# Patient Record
Sex: FEMALE | Race: WHITE | Employment: OTHER | URBAN - METROPOLITAN AREA
[De-identification: names, ages, dates, MRNs, and addresses within clinical notes are randomized per-mention and may not be internally consistent; named-entity substitution may affect disease eponyms.]

---

## 2017-04-27 ENCOUNTER — HOSPITAL ENCOUNTER (OUTPATIENT)
Dept: BONE DENSITY | Age: 82
Discharge: HOME | End: 2017-04-27
Attending: FAMILY MEDICINE
Payer: MEDICARE

## 2017-04-27 DIAGNOSIS — Z78.0 MENOPAUSE: ICD-10-CM

## 2017-04-27 PROCEDURE — 77080 DXA BONE DENSITY AXIAL: CPT

## 2017-06-23 ENCOUNTER — HOSPITAL ENCOUNTER (EMERGENCY)
Age: 82
Discharge: HOME | End: 2017-06-23
Attending: EMERGENCY MEDICINE
Payer: MEDICARE

## 2017-06-23 VITALS
BODY MASS INDEX: 28.88 KG/M2 | TEMPERATURE: 98.6 F | WEIGHT: 184 LBS | RESPIRATION RATE: 18 BRPM | SYSTOLIC BLOOD PRESSURE: 151 MMHG | DIASTOLIC BLOOD PRESSURE: 74 MMHG | OXYGEN SATURATION: 98 % | HEIGHT: 67 IN

## 2017-06-23 DIAGNOSIS — S80.811A ABRASION OF LEG, RIGHT, INITIAL ENCOUNTER: Primary | ICD-10-CM

## 2017-06-23 DIAGNOSIS — I83.90 VARICOSE VEIN OF LEG: ICD-10-CM

## 2017-06-23 PROCEDURE — 99282 EMERGENCY DEPT VISIT SF MDM: CPT

## 2017-06-23 NOTE — ED PROVIDER NOTES
HPI Comments: 9:11 AM Norbert Loya is a [de-identified] y.o. female with a history of HTN and Arthritis who presents to the emergency department c/o bleeding to R posterior lower leg that began yesterday after she was stuck with a thorn of a pierre bush. Pt reports excessive bleeding, EMS was called yesterday who bandaged the area. Pt states she had to replace the bandage before going to bed last night. No other complaints or concerns were noted at this time. PCP: Milagro Paulino MD      The history is provided by the patient. Past Medical History:   Diagnosis Date    Allergic rhinitis     Arthritis     Chronic bronchitis (HCC)     Chronic sinusitis     Hypertension     Sleep apnea        History reviewed. No pertinent surgical history. History reviewed. No pertinent family history. Social History     Social History    Marital status: SINGLE     Spouse name: N/A    Number of children: N/A    Years of education: N/A     Occupational History    Not on file. Social History Main Topics    Smoking status: Never Smoker    Smokeless tobacco: Never Used    Alcohol use No    Drug use: Not on file    Sexual activity: Not on file     Other Topics Concern    Not on file     Social History Narrative         ALLERGIES: Codeine and Sulfur    Review of Systems   Constitutional: Negative for chills and fever. HENT: Negative for congestion and rhinorrhea. Respiratory: Negative for cough and shortness of breath. Cardiovascular: Negative for chest pain and leg swelling. Gastrointestinal: Negative for abdominal pain and nausea. Genitourinary: Negative for dysuria and hematuria. Musculoskeletal: Negative for arthralgias and myalgias. Skin: Positive for wound (posterior R lower leg ). Negative for rash. Neurological: Negative for light-headedness and headaches. Psychiatric/Behavioral: Negative for confusion and hallucinations. All other systems reviewed and are negative.       Vitals: 06/23/17 0856   BP: 151/74   Resp: 18   Temp: 98.6 °F (37 °C)   SpO2: 98%   Weight: 83.5 kg (184 lb)   Height: 5' 7\" (1.702 m)            Physical Exam   Constitutional: She is oriented to person, place, and time. She appears well-developed. HENT:   Head: Normocephalic. Mouth/Throat: Oropharynx is clear and moist.   Eyes: Pupils are equal, round, and reactive to light. Neck: Normal range of motion. Cardiovascular: Normal rate and normal heart sounds. No murmur heard. Pulmonary/Chest: Effort normal. She has no wheezes. She has no rales. Abdominal: Soft. There is no tenderness. Musculoskeletal: Normal range of motion. She exhibits no edema. Neurological: She is alert and oriented to person, place, and time. Skin: Skin is warm and dry. Abrasion noted. R lower leg superficial abrasion across a small variceal vein. Abrasion has good hemostasis with no active bleeding. Nursing note and vitals reviewed. [facility]  ED Course       Procedures  Vitals:  Patient Vitals for the past 12 hrs:   Temp Resp BP SpO2   06/23/17 0856 98.6 °F (37 °C) 18 151/74 98 %   98 %. Percentage is within normal limits. Progress notes, Consult notes or additional Procedure notes:  9:17 AM  Pt has been assessed. Discussed all results with pt and pt agrees with plan for discharge. All questions answered at this time. ED warnings given for any new or worsening symptoms. Pt voices understanding. Pt discharged in stable condition. Disposition:  Diagnosis:   1. Abrasion of leg, right, initial encounter    2. Varicose vein of leg        Disposition: Discharged home.        Scribe Attestation:     I, [address] [place] [address] for and in the presence of Dany Mathur MD June 23, 2017 at 9:17 AM     Physician Attestation:   I personally performed the services described in this documentation, reviewed and edited the documentation which was dictated to the scribe in my presence, and it accurately records my words and actions.  Hesham Cruz MD  June 23, 2017 at 9:17 AM    Signed by: Iman Askew June 23, 2017, 9:17 AM

## 2017-06-23 NOTE — ED TRIAGE NOTES
Pt presents to the ED with posterior right leg bleeding after injuring leg yesterday on a thorn of pierre bush. Pt reports excessive bleeding called 911 for medics, states received bandage. Pt reports awoken last night with pajamas and bed \"was soaked in blood. \"

## 2017-06-23 NOTE — DISCHARGE INSTRUCTIONS

## 2017-06-23 NOTE — ED NOTES
Hoang Hamilton is a [de-identified] y.o. female that was discharged in good condition. The patients diagnosis, condition and treatment were explained to  patient and aftercare instructions were given. The patient verbalized understanding. Patient armband removed and shredded.

## 2017-07-03 ENCOUNTER — OFFICE VISIT (OUTPATIENT)
Dept: VASCULAR SURGERY | Age: 82
End: 2017-07-03

## 2017-07-03 VITALS
HEIGHT: 67 IN | WEIGHT: 184 LBS | HEART RATE: 68 BPM | RESPIRATION RATE: 18 BRPM | DIASTOLIC BLOOD PRESSURE: 70 MMHG | BODY MASS INDEX: 28.88 KG/M2 | SYSTOLIC BLOOD PRESSURE: 142 MMHG

## 2017-07-03 DIAGNOSIS — I83.891 BLEEDING FROM VARICOSE VEIN, RIGHT: Primary | ICD-10-CM

## 2017-07-03 NOTE — PROGRESS NOTES
Keke Engel    Chief Complaint   Patient presents with    Varicose Veins       History and Physical    Ms Wendy Winston is here as a self-referral for evaluation of varicose veins. She has actually had a cluster of varicose veins behind the right knee since adolescence. They have never been of any great concern to her as far as symptoms or cosmetics. However last week she was working in the garden, and she actually got stuck with a pierre thorn into 1 of the small varicose veins, and she experienced a lot of bleeding. EMS was called and they came out and were able to control the bleeding. She had a pressure bandage on that she wore. Later that evening she was trying to adjust the bandage and started bleeding again. She did get under control, and notify her primary care physician who advised that she go to the ED the next day. Of course by the time she went to the ED there was no further bleeding and she has had none since. She said she almost canceled today's visit, because she has had more problems with it. She has some things going on at home when she actually became quite emotional about. At this time she does not feel very inclined to want to do anything about the vein. But I did still discuss some options, see below. Past Medical History:   Diagnosis Date    Allergic rhinitis     Arthritis     Chronic bronchitis (HCC)     Chronic sinusitis     Hypertension     Sleep apnea      Patient Active Problem List   Diagnosis Code    Moderate obstructive sleep apnea, AHI 18.3 G47.33    Overweight (BMI 25.0-29. 9) E66.3     History reviewed. No pertinent surgical history. Current Outpatient Prescriptions   Medication Sig Dispense Refill    diltiazem hcl 300 mg Tb24 Take  by mouth.  benazepril (LOTENSIN) 40 mg tablet Take 40 mg by mouth daily.  pravastatin (PRAVACHOL) 40 mg tablet Take 40 mg by mouth nightly.  fluticasone (FLOVENT DISKUS) 50 mcg/actuation inhaler Take  by inhalation.  LORazepam (ATIVAN) 1 mg tablet Take  by mouth every four (4) hours as needed for Anxiety.  cpap machine kit by Does Not Apply route. Auto-CPAP at 5-20 cm with humidifier. Mask: of choice. Length of need 99 months. Replace mask and accessories as needed times 12 months. Download data at 30 days and fax at [phone]. Dx- Moderate FILIBERTO-AHI 18.3 1 Kit 0     Allergies   Allergen Reactions    Codeine Nausea and Vomiting    Sulfur Hives     Social History     Social History    Marital status: SINGLE     Spouse name: N/A    Number of children: N/A    Years of education: N/A     Occupational History    Not on file. Social History Main Topics    Smoking status: Never Smoker    Smokeless tobacco: Never Used    Alcohol use No    Drug use: Not on file    Sexual activity: Not on file     Other Topics Concern    Not on file     Social History Narrative      No family history on file. Review of Systems    Review of Systems - History obtained from the patient  General ROS: negative  Psychological ROS: negative  Ophthalmic ROS: negative  Respiratory ROS: negative  Cardiovascular ROS: no chest pain or dyspnea on exertion  Gastrointestinal ROS: negative  Musculoskeletal ROS: negative  Neurological ROS: negative  Dermatological ROS: negative  Vascular ROS: per HPI        Physical Exam:    Visit Vitals    /70 (BP 1 Location: Left arm, BP Patient Position: Sitting)    Pulse 68    Resp 18    Ht 5' 7\" (1.702 m)    Wt 184 lb (83.5 kg)    BMI 28.82 kg/m2      General:  Alert, cooperative, no distress. Head:  Normocephalic, without obvious abnormality, atraumatic. Eyes:    Conjunctivae/corneas clear. Pupils equal, round, reactive to light. Extraocular movements intact. Extremities: Extremities normal, atraumatic, no cyanosis, mild right leg edema. Cluster of varicose veins behind right knee   Pulses: 2+ and symmetric all extremities.    Skin: Skin color, texture, turgor normal. No rashes or lesions. Impression and Plan:  1. Bleeding from varicose vein, right      I did discuss the pathology of venous disease. As long as these may have been present, she really has not had any issues. This really was an isolated incident. I did tell her about different treatment options for vein problems though, to include ablation procedures and sclerotherapy - for which details for both were explained. She may be a candidate for one or both. I also discussed importance of conservative therapy, which would include stockings and elevation. But she again states that she does not feel like she has enough symptoms that she would warrant wanting to do any of these. But she is fearful that an incident like this could happen again. We both agreed it was a very isolated incidents, and she will just make efforts to be more careful. I did give her some literature to review, and did share that if she reviews it and would decide on wanting to pursue some treatment, that then I would then first order a reflux study to see what her treatment options were then actually be. Follow-up Disposition:  Return if symptoms worsen or fail to improve. OLIVIA Alexander    Portions of this note have been created using voice recognition software.

## 2017-08-11 ENCOUNTER — OFFICE VISIT (OUTPATIENT)
Dept: ORTHOPEDIC SURGERY | Age: 82
End: 2017-08-11

## 2017-08-11 VITALS
BODY MASS INDEX: 28.88 KG/M2 | OXYGEN SATURATION: 97 % | HEIGHT: 67 IN | SYSTOLIC BLOOD PRESSURE: 142 MMHG | TEMPERATURE: 97 F | HEART RATE: 77 BPM | DIASTOLIC BLOOD PRESSURE: 86 MMHG | RESPIRATION RATE: 18 BRPM | WEIGHT: 184 LBS

## 2017-08-11 DIAGNOSIS — G89.29 CHRONIC PAIN OF LEFT KNEE: Primary | ICD-10-CM

## 2017-08-11 DIAGNOSIS — M25.562 CHRONIC PAIN OF LEFT KNEE: Primary | ICD-10-CM

## 2017-08-11 DIAGNOSIS — M17.12 PRIMARY OSTEOARTHRITIS OF LEFT KNEE: ICD-10-CM

## 2017-08-11 NOTE — PROGRESS NOTES
Patient: Armand Barber                MRN: [id number]       SSN: [id number]  YOB: 1935        AGE: [de-identified] y.o. SEX: female  Body mass index is 28.82 kg/(m^2). PCP: Ubaldo Schirmer, MD  08/11/17    HISTORY: I had the pleasure of reviewing [place]. Juanito adkins is quite a character. She is a very young 50-year-old, status post scoliosis surgery and also has had a flare up of her left knee, where she was having difficulty with stairs, kneeling, and getting up and down from a chair. She has had no true locking or giving way, although not too much in the way of night pain. It has tended to resolve, and she is here for followup assessment with this. PHYSICAL EXAMINATION:  On examination today, she moves the head and neck adequately. She does have a touch of a movie theater sign when she first arises. The calf is nontender. Melissa's sign is negative. Her gait pattern tends to smooth out. Both hips rotate nicely. Both feet are warm and well-perfused. There is a little bit of numbness in the saphenous nerve distribution where they took some bone for her scoliosis surgery. The calf is nontender. Melissa's sign is negative. There is no foot drop. She has good strength, and she is much younger-appearing than her stated age. RADIOGRAPHS:  Review of her x-rays confirm moderately advanced patellofemoral arthritis. PLAN:  She had a flare-up. It is fairly quiescent now. She is welcome to have injections when she would like, and I would be very happy to help her with this. I would recommend nonoperative management for the time being. It has been a pleasure to share in her care. We will be happy to see her at any time for an injection.            REVIEW OF SYSTEMS:      CON: negative for weight loss, fever  EYE: negative for double vision  ENT: negative for hoarseness  RS:   negative for Tb  GI:    negative for blood in stool  :  negative for blood in urine  Other systems reviewed and noted below. Past Medical History:   Diagnosis Date    Allergic rhinitis     Arthritis     Chronic bronchitis (HCC)     Chronic sinusitis     Hypertension     Sleep apnea        History reviewed. No pertinent family history. Current Outpatient Prescriptions   Medication Sig Dispense Refill    diltiazem hcl 300 mg Tb24 Take  by mouth.  benazepril (LOTENSIN) 40 mg tablet Take 40 mg by mouth daily.  pravastatin (PRAVACHOL) 40 mg tablet Take 40 mg by mouth nightly.  fluticasone (FLOVENT DISKUS) 50 mcg/actuation inhaler Take  by inhalation.  LORazepam (ATIVAN) 1 mg tablet Take  by mouth every four (4) hours as needed for Anxiety.  cpap machine kit by Does Not Apply route. Auto-CPAP at 5-20 cm with humidifier. Mask: of choice. Length of need 99 months. Replace mask and accessories as needed times 12 months. Download data at 30 days and fax at [phone]. Dx- Moderate FILIBERTO-AHI 18.3 1 Kit 0       Allergies   Allergen Reactions    Codeine Nausea and Vomiting    Sulfur Hives       History reviewed. No pertinent surgical history. Social History     Social History    Marital status: SINGLE     Spouse name: N/A    Number of children: N/A    Years of education: N/A     Occupational History    Not on file. Social History Main Topics    Smoking status: Never Smoker    Smokeless tobacco: Never Used    Alcohol use No    Drug use: Not on file    Sexual activity: Not on file     Other Topics Concern    Not on file     Social History Narrative       Visit Vitals    /86    Pulse 77    Temp 97 °F (36.1 °C) (Oral)    Resp 18    Ht 5' 7\" (1.702 m)    Wt 184 lb (83.5 kg)    SpO2 97%    BMI 28.82 kg/m2         PHYSICAL EXAMINATION:  GENERAL: Alert and oriented x3, in no acute distress, well-developed, well-nourished, afebrile. HEART: No JVD.   EYES: No scleral icterus   NECK: No significant lymphadenopathy   LUNGS: No respiratory compromise or indrawing  ABDOMEN: Soft, non-tender, non-distended. Electronically signed by:  Enmanuel Rios MD

## 2019-08-19 ENCOUNTER — HOSPITAL ENCOUNTER (OUTPATIENT)
Dept: LAB | Age: 84
Discharge: HOME | End: 2019-08-19
Payer: MEDICARE

## 2019-08-19 LAB
C DIFF GDH STL QL: NEGATIVE
C DIFF TOX A+B STL QL IA: NEGATIVE
INTERPRETATION: NORMAL

## 2019-08-19 PROCEDURE — 87045 FECES CULTURE AEROBIC BACT: CPT

## 2019-08-19 PROCEDURE — 83631 LACTOFERRIN FECAL (QUANT): CPT

## 2019-08-19 PROCEDURE — 87177 OVA AND PARASITES SMEARS: CPT

## 2019-08-19 PROCEDURE — 87186 SC STD MICRODIL/AGAR DIL: CPT

## 2019-08-19 PROCEDURE — 87449 NOS EACH ORGANISM AG IA: CPT

## 2019-08-23 LAB
BACTERIA SPEC CULT: NORMAL
G LAMBLIA AG STL QL IA: NEGATIVE
LACTOFERRIN, LCTFLT: 81.6 UG/ML(G) (ref 0–7.24)
O+P STL CONC: NORMAL
SERVICE CMNT-IMP: NORMAL
SPECIMEN SOURCE: NORMAL

## 2019-08-28 LAB
O&P RESULT 1, 080877: NORMAL
SOURCE, RSRC56: NORMAL

## 2019-10-03 ENCOUNTER — APPOINTMENT (OUTPATIENT)
Dept: RADIATION THERAPY | Age: 84
End: 2019-10-03

## 2019-10-15 ENCOUNTER — HOSPITAL ENCOUNTER (OUTPATIENT)
Dept: RADIATION THERAPY | Age: 84
Discharge: HOME | End: 2019-10-15

## 2020-12-08 ENCOUNTER — HOSPITAL ENCOUNTER (EMERGENCY)
Age: 85
Discharge: HOME | End: 2020-12-08
Attending: EMERGENCY MEDICINE
Payer: MEDICARE

## 2020-12-08 VITALS
DIASTOLIC BLOOD PRESSURE: 87 MMHG | WEIGHT: 182 LBS | RESPIRATION RATE: 20 BRPM | HEART RATE: 88 BPM | BODY MASS INDEX: 27.58 KG/M2 | OXYGEN SATURATION: 95 % | HEIGHT: 68 IN | SYSTOLIC BLOOD PRESSURE: 156 MMHG | TEMPERATURE: 98.7 F

## 2020-12-08 DIAGNOSIS — R19.7 DIARRHEA OF PRESUMED INFECTIOUS ORIGIN: Primary | ICD-10-CM

## 2020-12-08 DIAGNOSIS — E87.6 HYPOKALEMIA: ICD-10-CM

## 2020-12-08 LAB
ALBUMIN SERPL-MCNC: 4.1 G/DL (ref 3.4–5)
ALBUMIN/GLOB SERPL: 1.1 {RATIO} (ref 0.8–1.7)
ALP SERPL-CCNC: 79 U/L (ref 45–117)
ALT SERPL-CCNC: 25 U/L (ref 13–56)
ANION GAP SERPL CALC-SCNC: 11 MMOL/L (ref 3–18)
AST SERPL-CCNC: 23 U/L (ref 10–38)
BASOPHILS # BLD: 0.1 K/UL (ref 0–0.1)
BASOPHILS NFR BLD: 1 % (ref 0–2)
BILIRUB SERPL-MCNC: 0.7 MG/DL (ref 0.2–1)
BUN SERPL-MCNC: 10 MG/DL (ref 7–18)
BUN/CREAT SERPL: 12 (ref 12–20)
CALCIUM SERPL-MCNC: 10.3 MG/DL (ref 8.5–10.1)
CHLORIDE SERPL-SCNC: 98 MMOL/L (ref 100–111)
CO2 SERPL-SCNC: 28 MMOL/L (ref 21–32)
CREAT SERPL-MCNC: 0.83 MG/DL (ref 0.6–1.3)
DIFFERENTIAL METHOD BLD: ABNORMAL
EOSINOPHIL # BLD: 0.1 K/UL (ref 0–0.4)
EOSINOPHIL NFR BLD: 1 % (ref 0–5)
ERYTHROCYTE [DISTWIDTH] IN BLOOD BY AUTOMATED COUNT: 13 % (ref 11.6–14.5)
GLOBULIN SER CALC-MCNC: 3.6 G/DL (ref 2–4)
GLUCOSE SERPL-MCNC: 87 MG/DL (ref 74–99)
HCT VFR BLD AUTO: 46.7 % (ref 35–45)
HGB BLD-MCNC: 16.5 G/DL (ref 12–16)
LACTATE BLD-SCNC: 1.35 MMOL/L (ref 0.4–2)
LIPASE SERPL-CCNC: 142 U/L (ref 73–393)
LYMPHOCYTES # BLD: 1.6 K/UL (ref 0.9–3.6)
LYMPHOCYTES NFR BLD: 16 % (ref 21–52)
MAGNESIUM SERPL-MCNC: 2 MG/DL (ref 1.6–2.6)
MCH RBC QN AUTO: 29.5 PG (ref 24–34)
MCHC RBC AUTO-ENTMCNC: 35.3 G/DL (ref 31–37)
MCV RBC AUTO: 83.4 FL (ref 74–97)
MONOCYTES # BLD: 1 K/UL (ref 0.05–1.2)
MONOCYTES NFR BLD: 9 % (ref 3–10)
NEUTS SEG # BLD: 7.6 K/UL (ref 1.8–8)
NEUTS SEG NFR BLD: 73 % (ref 40–73)
PLATELET # BLD AUTO: 283 K/UL (ref 135–420)
PMV BLD AUTO: 11.2 FL (ref 9.2–11.8)
POTASSIUM SERPL-SCNC: 3.1 MMOL/L (ref 3.5–5.5)
PROT SERPL-MCNC: 7.7 G/DL (ref 6.4–8.2)
RBC # BLD AUTO: 5.6 M/UL (ref 4.2–5.3)
SODIUM SERPL-SCNC: 137 MMOL/L (ref 136–145)
WBC # BLD AUTO: 10.3 K/UL (ref 4.6–13.2)

## 2020-12-08 PROCEDURE — 80053 COMPREHEN METABOLIC PANEL: CPT

## 2020-12-08 PROCEDURE — 83690 ASSAY OF LIPASE: CPT

## 2020-12-08 PROCEDURE — 87635 SARS-COV-2 COVID-19 AMP PRB: CPT

## 2020-12-08 PROCEDURE — 99285 EMERGENCY DEPT VISIT HI MDM: CPT

## 2020-12-08 PROCEDURE — 93005 ELECTROCARDIOGRAM TRACING: CPT

## 2020-12-08 PROCEDURE — 85025 COMPLETE CBC W/AUTO DIFF WBC: CPT

## 2020-12-08 PROCEDURE — 83735 ASSAY OF MAGNESIUM: CPT

## 2020-12-08 PROCEDURE — 74011250637 HC RX REV CODE- 250/637: Performed by: STUDENT IN AN ORGANIZED HEALTH CARE EDUCATION/TRAINING PROGRAM

## 2020-12-08 PROCEDURE — 96360 HYDRATION IV INFUSION INIT: CPT

## 2020-12-08 PROCEDURE — 74011250636 HC RX REV CODE- 250/636: Performed by: STUDENT IN AN ORGANIZED HEALTH CARE EDUCATION/TRAINING PROGRAM

## 2020-12-08 PROCEDURE — 83605 ASSAY OF LACTIC ACID: CPT

## 2020-12-08 RX ORDER — POTASSIUM CHLORIDE 20 MEQ/1
40 TABLET, EXTENDED RELEASE ORAL
Status: COMPLETED | OUTPATIENT
Start: 2020-12-08 | End: 2020-12-08

## 2020-12-08 RX ADMIN — POTASSIUM CHLORIDE 40 MEQ: 1500 TABLET, EXTENDED RELEASE ORAL at 19:33

## 2020-12-08 RX ADMIN — SODIUM CHLORIDE 500 ML: 900 INJECTION, SOLUTION INTRAVENOUS at 16:22

## 2020-12-08 NOTE — ED TRIAGE NOTES
Patient arrived via EMS wwith complaints of nausea vomiting and diarrhea since Saturday.  Remains afebrile

## 2020-12-08 NOTE — ED PROVIDER NOTES
EMERGENCY DEPARTMENT HISTORY AND PHYSICAL EXAM    6:37 PM      Date: 12/8/2020  Patient Name: Adilia Valladares    History of Presenting Illness     Chief Complaint   Patient presents with    Nausea    Vomiting    Diarrhea    Hypertension         History Provided By: Patient  Location/Duration/Severity/Modifying factors   80 y/o F with N/V/D Saturday that has since improved but she has residual \"diarrhea\" which she describes as having a BM daily, which is unlike her. She states she has stopped taking her potassium supplement as she thought it might be causing the increased BMs. Tolerating PO- has gatorade in her bag. Denies fever, respiratory symptoms, abdominal pain, chest pain, bloody or black stools. Also had her normal vertigo symptoms Sunday and yesterday which resolved today. PCP: Toni Briscoe MD    Current Facility-Administered Medications   Medication Dose Route Frequency Provider Last Rate Last Dose    potassium chloride (K-DUR, KLOR-CON) SR tablet 40 mEq  40 mEq Oral NOW Sera Lei MD         Current Outpatient Medications   Medication Sig Dispense Refill    diltiazem hcl 300 mg Tb24 Take  by mouth.  benazepril (LOTENSIN) 40 mg tablet Take 40 mg by mouth daily.  pravastatin (PRAVACHOL) 40 mg tablet Take 40 mg by mouth nightly.  fluticasone (FLOVENT DISKUS) 50 mcg/actuation inhaler Take  by inhalation.  LORazepam (ATIVAN) 1 mg tablet Take  by mouth every four (4) hours as needed for Anxiety.  cpap machine kit by Does Not Apply route. Auto-CPAP at 5-20 cm with humidifier. Mask: of choice. Length of need 99 months. Replace mask and accessories as needed times 12 months. Download data at 30 days and fax at [phone].  Dx- Moderate FILIBERTO-AHI 18.3 1 Kit 0       Past History     Past Medical History:  Past Medical History:   Diagnosis Date    Allergic rhinitis     Arthritis     Chronic bronchitis (HCC)     Chronic sinusitis     Hypertension     Sleep apnea        Past Surgical History:  No past surgical history on file. Family History:  No family history on file. Social History:  Social History     Tobacco Use    Smoking status: Never Smoker    Smokeless tobacco: Never Used   Substance Use Topics    Alcohol use: No    Drug use: Not on file       Allergies: Allergies   Allergen Reactions    Codeine Nausea and Vomiting    Sulfur Hives         Review of Systems     Review of Systems   Constitutional: Positive for appetite change. Negative for activity change, fatigue, fever and unexpected weight change. HENT: Negative for congestion and sore throat. Eyes: Negative for photophobia and visual disturbance. Respiratory: Negative for cough and shortness of breath. Cardiovascular: Negative for chest pain and leg swelling. Gastrointestinal: Positive for diarrhea, nausea and vomiting. Negative for abdominal pain and blood in stool. Genitourinary: Negative for dysuria, flank pain, frequency, hematuria and urgency. Musculoskeletal: Negative for myalgias. Neurological: Negative for dizziness, facial asymmetry, speech difficulty, weakness, light-headedness and numbness. Psychiatric/Behavioral: Negative for confusion. All other systems reviewed and are negative. Physical Exam     Visit Vitals  BP (!) 162/98   Pulse 87   Temp 98.7 °F (37.1 °C)   Resp 21   Ht 5' 8\" (1.727 m)   Wt 82.6 kg (182 lb)   SpO2 99%   BMI 27.67 kg/m²         Physical Exam  Vitals signs and nursing note reviewed. Constitutional:       General: She is not in acute distress. Appearance: Normal appearance. She is not ill-appearing or toxic-appearing. HENT:      Head: Normocephalic and atraumatic. Right Ear: External ear normal.      Left Ear: External ear normal.      Nose: Nose normal.      Mouth/Throat:      Mouth: Mucous membranes are moist.   Eyes:      Extraocular Movements: Extraocular movements intact.       Conjunctiva/sclera: Conjunctivae normal. Pupils: Pupils are equal, round, and reactive to light. Neck:      Musculoskeletal: Neck supple. Cardiovascular:      Rate and Rhythm: Regular rhythm. Tachycardia present. Pulses: Normal pulses. Pulmonary:      Effort: Pulmonary effort is normal. No respiratory distress. Abdominal:      General: There is no distension. Palpations: Abdomen is soft. Tenderness: There is no abdominal tenderness. Musculoskeletal:      Right lower leg: No edema. Left lower leg: No edema. Skin:     General: Skin is warm and dry. Capillary Refill: Capillary refill takes less than 2 seconds. Neurological:      General: No focal deficit present. Mental Status: She is alert and oriented to person, place, and time. Cranial Nerves: No cranial nerve deficit. Sensory: No sensory deficit. Motor: No weakness. Coordination: Coordination normal.   Psychiatric:         Mood and Affect: Mood normal.         Behavior: Behavior normal.           Diagnostic Study Results     Labs -  Recent Results (from the past 12 hour(s))   CBC WITH AUTOMATED DIFF    Collection Time: 12/08/20  3:30 PM   Result Value Ref Range    WBC 10.3 4.6 - 13.2 K/uL    RBC 5.60 (H) 4.20 - 5.30 M/uL    HGB 16.5 (H) 12.0 - 16.0 g/dL    HCT 46.7 (H) 35.0 - 45.0 %    MCV 83.4 74.0 - 97.0 FL    MCH 29.5 24.0 - 34.0 PG    MCHC 35.3 31.0 - 37.0 g/dL    RDW 13.0 11.6 - 14.5 %    PLATELET 904 615 - 833 K/uL    MPV 11.2 9.2 - 11.8 FL    NEUTROPHILS 73 40 - 73 %    LYMPHOCYTES 16 (L) 21 - 52 %    MONOCYTES 9 3 - 10 %    EOSINOPHILS 1 0 - 5 %    BASOPHILS 1 0 - 2 %    ABS. NEUTROPHILS 7.6 1.8 - 8.0 K/UL    ABS. LYMPHOCYTES 1.6 0.9 - 3.6 K/UL    ABS. MONOCYTES 1.0 0.05 - 1.2 K/UL    ABS. EOSINOPHILS 0.1 0.0 - 0.4 K/UL    ABS.  BASOPHILS 0.1 0.0 - 0.1 K/UL    DF AUTOMATED     METABOLIC PANEL, COMPREHENSIVE    Collection Time: 12/08/20  3:30 PM   Result Value Ref Range    Sodium 137 136 - 145 mmol/L    Potassium 3.1 (L) 3.5 - 5.5 mmol/L    Chloride 98 (L) 100 - 111 mmol/L    CO2 28 21 - 32 mmol/L    Anion gap 11 3.0 - 18 mmol/L    Glucose 87 74 - 99 mg/dL    BUN 10 7.0 - 18 MG/DL    Creatinine 0.83 0.6 - 1.3 MG/DL    BUN/Creatinine ratio 12 12 - 20      GFR est AA >60 >60 ml/min/1.73m2    GFR est non-AA >60 >60 ml/min/1.73m2    Calcium 10.3 (H) 8.5 - 10.1 MG/DL    Bilirubin, total 0.7 0.2 - 1.0 MG/DL    ALT (SGPT) 25 13 - 56 U/L    AST (SGOT) 23 10 - 38 U/L    Alk. phosphatase 79 45 - 117 U/L    Protein, total 7.7 6.4 - 8.2 g/dL    Albumin 4.1 3.4 - 5.0 g/dL    Globulin 3.6 2.0 - 4.0 g/dL    A-G Ratio 1.1 0.8 - 1.7     LIPASE    Collection Time: 12/08/20  3:30 PM   Result Value Ref Range    Lipase 142 73 - 393 U/L   MAGNESIUM    Collection Time: 12/08/20  3:30 PM   Result Value Ref Range    Magnesium 2.0 1.6 - 2.6 mg/dL   POC LACTIC ACID    Collection Time: 12/08/20  4:32 PM   Result Value Ref Range    Lactic Acid (POC) 1.35 0.40 - 2.00 mmol/L       Radiologic Studies -   No orders to display         Medical Decision Making   I am the first provider for this patient. I reviewed the vital signs, available nursing notes, past medical history, past surgical history, family history and social history. Vital Signs-Reviewed the patient's vital signs. EKG: Sinus tachycardia at 104 with 1st degree AV block (), LVH, no acute ischemic changes    Records Reviewed: Nursing Notes (Time of Review: 6:37 PM)    ED Course: Progress Notes, Reevaluation, and Consults:         Provider Notes (Medical Decision Making):   MDM  Number of Diagnoses or Management Options  Diarrhea of presumed infectious origin:   Hypokalemia:   Diagnosis management comments: 80 y/o F with likely VGE vs food poisoning (onset of symptoms after dinner with sister), now nearly resolved. Patient well-appearing, afebrile, hemodynamically stable. Abdomen soft and nontender. Normal neuro exam. No UTI sx. No CP or SOB. EKG without acute ischemic changes.  Tachycardia resolved with IVF and patient's home gatorade; suspect dehydration given hemoconcentration on CBC and recent illness. No leukocytosis, normal lactate. Hypokalemia noted at 3.1; repleted. Corresponds to patient's reported lapse in taking potassium supplements. Other electrolytes including magnesium and kidney function unremarkable. Patient reassured and given education with regard to potassium supplements. Will discharge with return precautions. Patient understands and agrees with plan. Covid swab per patient request.       Amount and/or Complexity of Data Reviewed  Clinical lab tests: ordered and reviewed  Tests in the medicine section of CPT®: ordered and reviewed  Review and summarize past medical records: yes  Independent visualization of images, tracings, or specimens: yes            Diagnosis     Clinical Impression:   1. Diarrhea of presumed infectious origin    2. Hypokalemia        Disposition: Discharged    Follow-up Information    None          Patient's Medications   Start Taking    No medications on file   Continue Taking    BENAZEPRIL (LOTENSIN) 40 MG TABLET    Take 40 mg by mouth daily. CPAP MACHINE KIT    by Does Not Apply route. Auto-CPAP at 5-20 cm with humidifier. Mask: of choice. Length of need 99 months. Replace mask and accessories as needed times 12 months. Download data at 30 days and fax at [phone]. Dx- Moderate FILIBERTO-AHI 18.3    DILTIAZEM  MG TB24    Take  by mouth. FLUTICASONE (FLOVENT DISKUS) 50 MCG/ACTUATION INHALER    Take  by inhalation. LORAZEPAM (ATIVAN) 1 MG TABLET    Take  by mouth every four (4) hours as needed for Anxiety. PRAVASTATIN (PRAVACHOL) 40 MG TABLET    Take 40 mg by mouth nightly. These Medications have changed    No medications on file   Stop Taking    No medications on file     Disclaimer: Sections of this note are dictated using utilizing voice recognition software. Minor typographical errors may be present.  If questions arise, please do not hesitate to contact me or call our department.

## 2020-12-08 NOTE — DISCHARGE INSTRUCTIONS
Patient Education        Diarrhea: Care Instructions  Your Care Instructions     Diarrhea is loose, watery stools (bowel movements). The exact cause is often hard to find. Sometimes diarrhea is your body's way of getting rid of what caused an upset stomach. Viruses, food poisoning, and many medicines can cause diarrhea. Some people get diarrhea in response to emotional stress, anxiety, or certain foods. Almost everyone has diarrhea now and then. It usually isn't serious, and your stools will return to normal soon. The important thing to do is replace the fluids you have lost, so you can prevent dehydration. The doctor has checked you carefully, but problems can develop later. If you notice any problems or new symptoms, get medical treatment right away. Follow-up care is a key part of your treatment and safety. Be sure to make and go to all appointments, and call your doctor if you are having problems. It's also a good idea to know your test results and keep a list of the medicines you take. How can you care for yourself at home? · Watch for signs of dehydration, which means your body has lost too much water. Dehydration is a serious condition and should be treated right away. Signs of dehydration are:  ? Increasing thirst and dry eyes and mouth. ? Feeling faint or lightheaded. ? A smaller amount of urine than normal.  · To prevent dehydration, drink plenty of fluids. Choose water and other caffeine-free clear liquids until you feel better. If you have kidney, heart, or liver disease and have to limit fluids, talk with your doctor before you increase the amount of fluids you drink. · Begin eating small amounts of mild foods the next day, if you feel like it. ? Try yogurt that has live cultures of Lactobacillus. (Check the label.)  ? Avoid spicy foods, fruits, alcohol, and caffeine until 48 hours after all symptoms are gone. ? Avoid chewing gum that contains sorbitol. ?  Avoid dairy products (except for yogurt with Lactobacillus) while you have diarrhea and for 3 days after symptoms are gone. · The doctor may recommend that you take over-the-counter medicine, such as loperamide (Imodium), if you still have diarrhea after 6 hours. Read and follow all instructions on the label. Do not use this medicine if you have bloody diarrhea, a high fever, or other signs of serious illness. Call your doctor if you think you are having a problem with your medicine. When should you call for help? Call 911 anytime you think you may need emergency care. For example, call if:    · You passed out (lost consciousness).     · Your stools are maroon or very bloody. Call your doctor now or seek immediate medical care if:    · You are dizzy or lightheaded, or you feel like you may faint.     · Your stools are black and look like tar, or they have streaks of blood.     · You have new or worse belly pain.     · You have symptoms of dehydration, such as:  ? Dry eyes and a dry mouth. ? Passing only a little dark urine. ? Feeling thirstier than usual.     · You have a new or higher fever. Watch closely for changes in your health, and be sure to contact your doctor if:    · Your diarrhea is getting worse.     · You see pus in the diarrhea.     · You are not getting better after 2 days (48 hours). Where can you learn more? Go to http://www.gray.com/  Enter [id number] in the search box to learn more about \"Diarrhea: Care Instructions. \"  Current as of: June 26, 2019               Content Version: 12.6  © 8748-5603 [employer], Incorporated. Care instructions adapted under license by [employer] (which disclaims liability or warranty for this information). If you have questions about a medical condition or this instruction, always ask your healthcare professional. [place] [address] any warranty or liability for your use of this information.          Patient Education        Hypokalemia: Care Instructions  Your Care Instructions     Hypokalemia (say \"ie-vg-cab-BEATA-kenneth-uh\") is a low level of potassium. The heart, muscles, kidneys, and nervous system all need potassium to work well. This problem has many different causes. Kidney problems, diet, and medicines like diuretics and laxatives can cause it. So can vomiting or diarrhea. In some cases, cancer is the cause. Your doctor may do tests to find the cause of your low potassium levels. You may need medicines to bring your potassium levels back to normal. You may also need regular blood tests to check your potassium. If you have very low potassium, you may need intravenous (IV) medicines. You also may need tests to check the electrical activity of your heart. Heart problems caused by low potassium levels can be very serious. Follow-up care is a key part of your treatment and safety. Be sure to make and go to all appointments, and call your doctor if you are having problems. It's also a good idea to know your test results and keep a list of the medicines you take. How can you care for yourself at home? · If your doctor recommends it, eat foods that have a lot of potassium. These include fresh fruits, juices, and vegetables. They also include nuts, beans, and milk. · Be safe with medicines. If your doctor prescribes medicines or potassium supplements, take them exactly as directed. Call your doctor if you have any problems with your medicines. · Get your potassium levels tested as often as your doctor tells you. When should you call for help? Call 911 anytime you think you may need emergency care. For example, call if:    · You feel like your heart is missing beats. Heart problems caused by low potassium can cause death.     · You passed out (lost consciousness).     · You have a seizure.    Call your doctor now or seek immediate medical care if:    · You feel weak or unusually tired.     · You have severe arm or leg cramps.     · You have tingling or numbness.     · You feel sick to your stomach, or you vomit.     · You have belly cramps.     · You feel bloated or constipated.     · You have to urinate a lot.     · You feel very thirsty most of the time.     · You are dizzy or lightheaded, or you feel like you may faint.     · You feel depressed, or you lose touch with reality. Watch closely for changes in your health, and be sure to contact your doctor if:    · You do not get better as expected. Where can you learn more? Go to http://www.gray.com/  Enter G358 in the search box to learn more about \"Hypokalemia: Care Instructions. \"  Current as of: March 31, 2020               Content Version: 12.6  © 5680-0052 [employer]. Care instructions adapted under license by [employer] (which disclaims liability or warranty for this information). If you have questions about a medical condition or this instruction, always ask your healthcare professional. [address] any warranty or liability for your use of this information.

## 2020-12-09 ENCOUNTER — PATIENT OUTREACH (OUTPATIENT)
Dept: CASE MANAGEMENT | Age: 85
End: 2020-12-09

## 2020-12-09 LAB
ATRIAL RATE: 104 BPM
ATRIAL RATE: 104 BPM
CALCULATED P AXIS, ECG09: 67 DEGREES
CALCULATED P AXIS, ECG09: 9 DEGREES
CALCULATED R AXIS, ECG10: -144 DEGREES
CALCULATED R AXIS, ECG10: -46 DEGREES
CALCULATED T AXIS, ECG11: 103 DEGREES
CALCULATED T AXIS, ECG11: 91 DEGREES
DIAGNOSIS, 93000: NORMAL
DIAGNOSIS, 93000: NORMAL
P-R INTERVAL, ECG05: 232 MS
P-R INTERVAL, ECG05: 234 MS
Q-T INTERVAL, ECG07: 334 MS
Q-T INTERVAL, ECG07: 360 MS
QRS DURATION, ECG06: 100 MS
QRS DURATION, ECG06: 98 MS
QTC CALCULATION (BEZET), ECG08: 439 MS
QTC CALCULATION (BEZET), ECG08: 474 MS
VENTRICULAR RATE, ECG03: 104 BPM
VENTRICULAR RATE, ECG03: 104 BPM

## 2020-12-09 NOTE — PROGRESS NOTES
Date/Time:  12/9/2020 9:11 AM   Call within 2 business days of discharge: Yes   Attempted to reach patient by telephone. Unable to leave a message as the number listed is not in service. Will close episode.

## 2020-12-10 LAB — SARS-COV-2, COV2NT: NOT DETECTED

## 2022-04-23 ENCOUNTER — HOSPITAL ENCOUNTER (EMERGENCY)
Age: 87
Discharge: HOME | End: 2022-04-23
Attending: EMERGENCY MEDICINE
Payer: MEDICARE

## 2022-04-23 VITALS
DIASTOLIC BLOOD PRESSURE: 84 MMHG | SYSTOLIC BLOOD PRESSURE: 137 MMHG | TEMPERATURE: 97.8 F | RESPIRATION RATE: 20 BRPM | HEART RATE: 77 BPM | OXYGEN SATURATION: 92 %

## 2022-04-23 DIAGNOSIS — H81.10 BENIGN PAROXYSMAL POSITIONAL VERTIGO, UNSPECIFIED LATERALITY: Primary | ICD-10-CM

## 2022-04-23 LAB
ANION GAP SERPL CALC-SCNC: 7 MMOL/L (ref 3–18)
APPEARANCE UR: CLEAR
BASOPHILS # BLD: 0.1 K/UL (ref 0–0.1)
BASOPHILS NFR BLD: 1 % (ref 0–2)
BILIRUB UR QL: NEGATIVE
BUN SERPL-MCNC: 11 MG/DL (ref 7–18)
BUN/CREAT SERPL: 14 (ref 12–20)
CALCIUM SERPL-MCNC: 8.8 MG/DL (ref 8.5–10.1)
CHLORIDE SERPL-SCNC: 104 MMOL/L (ref 100–111)
CO2 SERPL-SCNC: 29 MMOL/L (ref 21–32)
COLOR UR: YELLOW
CREAT SERPL-MCNC: 0.81 MG/DL (ref 0.6–1.3)
DIFFERENTIAL METHOD BLD: ABNORMAL
EOSINOPHIL # BLD: 0.1 K/UL (ref 0–0.4)
EOSINOPHIL NFR BLD: 1 % (ref 0–5)
ERYTHROCYTE [DISTWIDTH] IN BLOOD BY AUTOMATED COUNT: 12.9 % (ref 11.6–14.5)
GLUCOSE SERPL-MCNC: 101 MG/DL (ref 74–99)
GLUCOSE UR STRIP.AUTO-MCNC: NEGATIVE MG/DL
HCT VFR BLD AUTO: 42 % (ref 35–45)
HGB BLD-MCNC: 14.1 G/DL (ref 12–16)
HGB UR QL STRIP: NEGATIVE
IMM GRANULOCYTES # BLD AUTO: 0.1 K/UL (ref 0–0.04)
IMM GRANULOCYTES NFR BLD AUTO: 1 % (ref 0–0.5)
KETONES UR QL STRIP.AUTO: NEGATIVE MG/DL
LEUKOCYTE ESTERASE UR QL STRIP.AUTO: NEGATIVE
LYMPHOCYTES # BLD: 1.2 K/UL (ref 0.9–3.6)
LYMPHOCYTES NFR BLD: 13 % (ref 21–52)
MCH RBC QN AUTO: 28.8 PG (ref 24–34)
MCHC RBC AUTO-ENTMCNC: 33.6 G/DL (ref 31–37)
MCV RBC AUTO: 85.9 FL (ref 78–100)
MONOCYTES # BLD: 0.6 K/UL (ref 0.05–1.2)
MONOCYTES NFR BLD: 7 % (ref 3–10)
NEUTS SEG # BLD: 7.2 K/UL (ref 1.8–8)
NEUTS SEG NFR BLD: 79 % (ref 40–73)
NITRITE UR QL STRIP.AUTO: NEGATIVE
NRBC # BLD: 0 K/UL (ref 0–0.01)
NRBC BLD-RTO: 0 PER 100 WBC
PH UR STRIP: 8.5 [PH] (ref 5–8)
PLATELET # BLD AUTO: 254 K/UL (ref 135–420)
PMV BLD AUTO: 11.8 FL (ref 9.2–11.8)
POTASSIUM SERPL-SCNC: 3.2 MMOL/L (ref 3.5–5.5)
PROT UR STRIP-MCNC: NEGATIVE MG/DL
RBC # BLD AUTO: 4.89 M/UL (ref 4.2–5.3)
SODIUM SERPL-SCNC: 140 MMOL/L (ref 136–145)
SP GR UR REFRACTOMETRY: 1.01 (ref 1–1.03)
TROPONIN-HIGH SENSITIVITY: 6 NG/L (ref 0–54)
UROBILINOGEN UR QL STRIP.AUTO: 0.2 EU/DL (ref 0.2–1)
WBC # BLD AUTO: 9.1 K/UL (ref 4.6–13.2)

## 2022-04-23 PROCEDURE — 81003 URINALYSIS AUTO W/O SCOPE: CPT

## 2022-04-23 PROCEDURE — 74011250636 HC RX REV CODE- 250/636: Performed by: EMERGENCY MEDICINE

## 2022-04-23 PROCEDURE — 84484 ASSAY OF TROPONIN QUANT: CPT

## 2022-04-23 PROCEDURE — 80048 BASIC METABOLIC PNL TOTAL CA: CPT

## 2022-04-23 PROCEDURE — 99284 EMERGENCY DEPT VISIT MOD MDM: CPT

## 2022-04-23 PROCEDURE — 93005 ELECTROCARDIOGRAM TRACING: CPT

## 2022-04-23 PROCEDURE — 85025 COMPLETE CBC W/AUTO DIFF WBC: CPT

## 2022-04-23 RX ORDER — MECLIZINE HCL 12.5 MG 12.5 MG/1
25 TABLET ORAL
Status: COMPLETED | OUTPATIENT
Start: 2022-04-23 | End: 2022-04-23

## 2022-04-23 RX ORDER — MECLIZINE HYDROCHLORIDE 25 MG/1
TABLET ORAL
Qty: 20 TABLET | Refills: 0 | Status: SHIPPED | OUTPATIENT
Start: 2022-04-23

## 2022-04-23 RX ADMIN — MECLIZINE 25 MG: 12.5 TABLET ORAL at 17:37

## 2022-04-23 NOTE — ED TRIAGE NOTES
Patient arrived via [facility] with c/o general malaise, dizziness and increased anxiety starting today. Upon arrival patient alert and oriented x 4, tearful. Denies CP, abdominal pain, n/v/d. Reports SOB intermittently.

## 2022-04-23 NOTE — ED PROVIDER NOTES
EMERGENCY DEPARTMENT HISTORY AND PHYSICAL EXAM    5:26 PM patient seen at this time in room 12      Date: 4/23/2022  Patient Name: Arnulfo Garvey    History of Presenting Illness     Chief Complaint   Patient presents with    Fatigue    Dizziness         History Provided By: patient    Additional History (Context): Arnulfo Garvey is a 81 y.o. female presents with had an event at between 8 and 11 AM, room spinning sensation when she stood up or when she moved her head. She has had prior episodes of positional vertigo. Accompanied with the symptoms she also had vague sense of nausea and at one point some chest heaviness. No lateralizing signs or other focal neurologic deficits. At the time of my exam everything is subsided until we conduct the hints exam where she has recurrence of the vertiginous symptoms. PCP: Adenike Graves MD    Chief Complaint:   Duration:    Timing:    Location:   Quality:   Severity:   Modifying Factors:   Associated Symptoms:       Current Facility-Administered Medications   Medication Dose Route Frequency Provider Last Rate Last Admin    meclizine (ANTIVERT) tablet 25 mg  25 mg Oral NOW Xochilt Malone MD         Current Outpatient Medications   Medication Sig Dispense Refill    diltiazem hcl 300 mg Tb24 Take  by mouth.  benazepril (LOTENSIN) 40 mg tablet Take 40 mg by mouth daily.  pravastatin (PRAVACHOL) 40 mg tablet Take 40 mg by mouth nightly.  fluticasone (FLOVENT DISKUS) 50 mcg/actuation inhaler Take  by inhalation.  LORazepam (ATIVAN) 1 mg tablet Take  by mouth every four (4) hours as needed for Anxiety.  cpap machine kit by Does Not Apply route. Auto-CPAP at 5-20 cm with humidifier. Mask: of choice. Length of need 99 months. Replace mask and accessories as needed times 12 months. Download data at 30 days and fax at [phone].  Dx- Moderate FILIBERTO-AHI 18.3 1 Kit 0       Past History     Past Medical History:  Past Medical History: Diagnosis Date    Allergic rhinitis     Arthritis     Chronic bronchitis (HCC)     Chronic sinusitis     Hypertension     Sleep apnea        Past Surgical History:  No past surgical history on file. Family History:  No family history on file. Social History:  Social History     Tobacco Use    Smoking status: Never Smoker    Smokeless tobacco: Never Used   Substance Use Topics    Alcohol use: No    Drug use: Not on file       Allergies: Allergies   Allergen Reactions    Codeine Nausea and Vomiting    Sulfur Hives         Review of Systems     Review of Systems   Constitutional: Negative for diaphoresis and fever. HENT: Negative for congestion and sore throat. Eyes: Negative for pain and itching. Respiratory: Negative for cough and shortness of breath. Cardiovascular: Positive for chest pain. Negative for palpitations. Gastrointestinal: Negative for abdominal pain and diarrhea. Endocrine: Negative for polydipsia and polyuria. Genitourinary: Negative for dysuria and hematuria. Musculoskeletal: Negative for arthralgias and myalgias. Skin: Negative for rash and wound. Neurological: Negative for seizures and syncope. Hematological: Does not bruise/bleed easily. Psychiatric/Behavioral: Negative for agitation and hallucinations. Physical Exam       Patient Vitals for the past 12 hrs:   Temp Pulse Resp BP SpO2   04/23/22 1649  63 17 (!) 164/76 97 %   04/23/22 1635 97.8 °F (36.6 °C)  21 (!) 161/117 98 %       IPVITALS  Patient Vitals for the past 24 hrs:   BP Temp Pulse Resp SpO2   04/23/22 1649 (!) 164/76  63 17 97 %   04/23/22 1635 (!) 161/117 97.8 °F (36.6 °C)  21 98 %       Physical Exam  Vitals and nursing note reviewed. Constitutional:       Appearance: She is well-developed. HENT:      Head: Normocephalic and atraumatic. Eyes:      General: No scleral icterus. Extraocular Movements: Extraocular movements intact.       Conjunctiva/sclera: Conjunctivae normal.      Pupils: Pupils are equal, round, and reactive to light. Neck:      Vascular: No JVD. Cardiovascular:      Rate and Rhythm: Normal rate and regular rhythm. Heart sounds: Normal heart sounds. Comments: 4 intact extremity pulses  Pulmonary:      Effort: Pulmonary effort is normal.      Breath sounds: Normal breath sounds. Abdominal:      Palpations: Abdomen is soft. There is no mass. Tenderness: There is no abdominal tenderness. Musculoskeletal:         General: Normal range of motion. Cervical back: Normal range of motion and neck supple. Lymphadenopathy:      Cervical: No cervical adenopathy. Skin:     General: Skin is warm and dry. Neurological:      General: No focal deficit present. Mental Status: She is alert. Cranial Nerves: No cranial nerve deficit. Comments: Hints exam reveals left beating nystagmus, otherwise normal           Diagnostic Study Results   Labs -  No results found for this or any previous visit (from the past 24 hour(s)). Radiologic Studies -   No orders to display     No results found. Medications ordered:   Medications   meclizine (ANTIVERT) tablet 25 mg (has no administration in time range)         Medical Decision Making   Initial Medical Decision Making and DDx:  Vertiginous symptoms extinguish when she sits up I do not think she has a flow-limiting lesion. Also not consistent with a posterior stroke. Plan on treating for positional vertigo. Due to the chest pressure will rule out MI, she has had low potassium before so check for electrolyte abnormality as well. Twelve-lead EKG sinus rhythm with first-degree block no acute ischemic process    ED Course: Progress Notes, Reevaluation, and Consults:     9:22 PM patient feeling a bit off still. Did get up to pee. Will order UA.    9:22 PM : Pt care transferred to oncoming ED provider.  History of patient complaint(s), available diagnostic reports and current treatment plan has been discussed thoroughly. Dr Nixon Moralez, , anticipate DC    Oncoming 's assistance in completion of this plan is greatly appreciated but it should be noted that I will be the provider of record for this patient. I am the first provider for this patient. I reviewed the vital signs, available nursing notes, past medical history, past surgical history, family history and social history. Patient Vitals for the past 12 hrs:   Temp Pulse Resp BP SpO2   04/23/22 1649  63 17 (!) 164/76 97 %   04/23/22 1635 97.8 °F (36.6 °C)  21 (!) 161/117 98 %       Vital Signs-Reviewed the patient's vital signs. Pulse Oximetry Analysis, Cardiac Monitor, 12 lead ekg:      Interpreted by the EP. Records Reviewed: Nursing notes reviewed (Time of Review: 5:26 PM)    Procedures:   Critical Care Time:   Aspirin: (was aspirin given for stroke?)    Diagnosis     Clinical Impression: No diagnosis found. Disposition:       Follow-up Information    None          Patient's Medications   Start Taking    No medications on file   Continue Taking    BENAZEPRIL (LOTENSIN) 40 MG TABLET    Take 40 mg by mouth daily. CPAP MACHINE KIT    by Does Not Apply route. Auto-CPAP at 5-20 cm with humidifier. Mask: of choice. Length of need 99 months. Replace mask and accessories as needed times 12 months. Download data at 30 days and fax at [phone]. Dx- Moderate FILIBERTO-AHI 18.3    DILTIAZEM  MG TB24    Take  by mouth. FLUTICASONE (FLOVENT DISKUS) 50 MCG/ACTUATION INHALER    Take  by inhalation. LORAZEPAM (ATIVAN) 1 MG TABLET    Take  by mouth every four (4) hours as needed for Anxiety. PRAVASTATIN (PRAVACHOL) 40 MG TABLET    Take 40 mg by mouth nightly.    These Medications have changed    No medications on file   Stop Taking    No medications on file     _______________________________    Notes:    Dede Red MD using Dragon dictation      _______________________________

## 2022-04-24 LAB
ATRIAL RATE: 64 BPM
CALCULATED P AXIS, ECG09: 88 DEGREES
CALCULATED R AXIS, ECG10: -50 DEGREES
CALCULATED T AXIS, ECG11: 72 DEGREES
DIAGNOSIS, 93000: NORMAL
P-R INTERVAL, ECG05: 238 MS
Q-T INTERVAL, ECG07: 456 MS
QRS DURATION, ECG06: 94 MS
QTC CALCULATION (BEZET), ECG08: 470 MS
VENTRICULAR RATE, ECG03: 64 BPM

## 2022-04-24 NOTE — ED NOTES
Patient very upset. Patient states that she asked for her IV to be removed but nobody came back to remove it. Patient states that the monitor has not stopped dinging. Patient has connected herself entirely from the cardiac monitor which is why it is alarming. Attempted to explain this to patient but patient said that she does not need to know why. Patient states that she has not been offered food or beverage her entire stay. Offered patient everything that we had, peanut butter and jelly, turkey sandwich, etc. Patient politely declined and asked for crackers and juice. Juice and crackers provided. Patient states that the doctor came in over an hour ago and told her that she was discharged, however, patient was discharged at [phone]. Patient given a belonging for her clothing and discharge paperwork. Patient taken out of department via wheelchair. Pt second requested x RX. PLEASE ADVISE.

## 2022-04-24 NOTE — ED NOTES
Patient care assumed from Dr. Lela Arteaga. Refer to original note for more details. 76-year-old female presenting for evaluation of positional vertigo. No lateralizing signs or neurologic deficits on exam.  Patient is improved after receiving meclizine. Was pending urinalysis which is negative for infection. Patient reassured and will be discharged at this time with meclizine prescription. Return precautions advised. Patient verbalized understanding agreement with plan.

## 2022-04-24 NOTE — DISCHARGE INSTRUCTIONS
Take the prescribed meclizine as needed for your positional vertigo return for any new or worsening symptoms

## 2023-12-11 ENCOUNTER — APPOINTMENT (OUTPATIENT)
Facility: HOSPITAL | Age: 88
DRG: 305 | End: 2023-12-11
Payer: MEDICARE

## 2023-12-11 ENCOUNTER — HOSPITAL ENCOUNTER (INPATIENT)
Facility: HOSPITAL | Age: 88
LOS: 2 days | Discharge: HOME HEALTH | DRG: 305 | End: 2023-12-13
Attending: EMERGENCY MEDICINE | Admitting: STUDENT IN AN ORGANIZED HEALTH CARE EDUCATION/TRAINING PROGRAM
Payer: MEDICARE

## 2023-12-11 DIAGNOSIS — I67.4 HYPERTENSIVE ENCEPHALOPATHY SYNDROME: Primary | ICD-10-CM

## 2023-12-11 PROBLEM — F41.9 ANXIETY: Status: ACTIVE | Noted: 2023-12-11

## 2023-12-11 PROBLEM — R42 ORTHOSTATIC DIZZINESS: Status: ACTIVE | Noted: 2023-12-11

## 2023-12-11 PROBLEM — F32.A DEPRESSION: Status: ACTIVE | Noted: 2023-12-11

## 2023-12-11 PROBLEM — E78.5 HYPERLIPIDEMIA: Status: ACTIVE | Noted: 2023-12-11

## 2023-12-11 PROBLEM — I10 HYPERTENSION: Status: ACTIVE | Noted: 2023-12-11

## 2023-12-11 LAB
ALBUMIN SERPL-MCNC: 3.6 G/DL (ref 3.4–5)
ALBUMIN/GLOB SERPL: 0.9 (ref 0.8–1.7)
ALP SERPL-CCNC: 93 U/L (ref 45–117)
ALT SERPL-CCNC: 19 U/L (ref 13–56)
ANION GAP SERPL CALC-SCNC: 6 MMOL/L (ref 3–18)
APPEARANCE UR: CLEAR
AST SERPL-CCNC: 21 U/L (ref 10–38)
BASOPHILS # BLD: 0.1 K/UL (ref 0–0.1)
BASOPHILS NFR BLD: 1 % (ref 0–2)
BILIRUB SERPL-MCNC: 0.4 MG/DL (ref 0.2–1)
BILIRUB UR QL: NEGATIVE
BUN SERPL-MCNC: 15 MG/DL (ref 7–18)
BUN/CREAT SERPL: 17 (ref 12–20)
CALCIUM SERPL-MCNC: 9.6 MG/DL (ref 8.5–10.1)
CHLORIDE SERPL-SCNC: 105 MMOL/L (ref 100–111)
CO2 SERPL-SCNC: 28 MMOL/L (ref 21–32)
COLOR UR: YELLOW
CREAT SERPL-MCNC: 0.9 MG/DL (ref 0.6–1.3)
DIFFERENTIAL METHOD BLD: ABNORMAL
EOSINOPHIL # BLD: 0.1 K/UL (ref 0–0.4)
EOSINOPHIL NFR BLD: 1 % (ref 0–5)
ERYTHROCYTE [DISTWIDTH] IN BLOOD BY AUTOMATED COUNT: 12.9 % (ref 11.6–14.5)
GLOBULIN SER CALC-MCNC: 4.1 G/DL (ref 2–4)
GLUCOSE SERPL-MCNC: 81 MG/DL (ref 74–99)
GLUCOSE UR STRIP.AUTO-MCNC: NEGATIVE MG/DL
HCT VFR BLD AUTO: 45.5 % (ref 35–45)
HGB BLD-MCNC: 14.9 G/DL (ref 12–16)
HGB UR QL STRIP: NEGATIVE
IMM GRANULOCYTES # BLD AUTO: 0.1 K/UL (ref 0–0.04)
IMM GRANULOCYTES NFR BLD AUTO: 1 % (ref 0–0.5)
KETONES UR QL STRIP.AUTO: NEGATIVE MG/DL
LEUKOCYTE ESTERASE UR QL STRIP.AUTO: NEGATIVE
LYMPHOCYTES # BLD: 2.1 K/UL (ref 0.9–3.6)
LYMPHOCYTES NFR BLD: 13 % (ref 21–52)
MCH RBC QN AUTO: 28.1 PG (ref 24–34)
MCHC RBC AUTO-ENTMCNC: 32.7 G/DL (ref 31–37)
MCV RBC AUTO: 85.8 FL (ref 78–100)
MONOCYTES # BLD: 1.4 K/UL (ref 0.05–1.2)
MONOCYTES NFR BLD: 8 % (ref 3–10)
NEUTS SEG # BLD: 13.2 K/UL (ref 1.8–8)
NEUTS SEG NFR BLD: 78 % (ref 40–73)
NITRITE UR QL STRIP.AUTO: NEGATIVE
NRBC # BLD: 0 K/UL (ref 0–0.01)
NRBC BLD-RTO: 0 PER 100 WBC
PH UR STRIP: 8 (ref 5–8)
PLATELET # BLD AUTO: 383 K/UL (ref 135–420)
PMV BLD AUTO: 11.9 FL (ref 9.2–11.8)
POTASSIUM SERPL-SCNC: 2.9 MMOL/L (ref 3.5–5.5)
PROT SERPL-MCNC: 7.7 G/DL (ref 6.4–8.2)
PROT UR STRIP-MCNC: NEGATIVE MG/DL
RBC # BLD AUTO: 5.3 M/UL (ref 4.2–5.3)
SODIUM SERPL-SCNC: 139 MMOL/L (ref 136–145)
SP GR UR REFRACTOMETRY: 1.01 (ref 1–1.03)
UROBILINOGEN UR QL STRIP.AUTO: 1 EU/DL (ref 0.2–1)
WBC # BLD AUTO: 17 K/UL (ref 4.6–13.2)

## 2023-12-11 PROCEDURE — 2580000003 HC RX 258

## 2023-12-11 PROCEDURE — 85025 COMPLETE CBC W/AUTO DIFF WBC: CPT

## 2023-12-11 PROCEDURE — 6360000002 HC RX W HCPCS

## 2023-12-11 PROCEDURE — 93005 ELECTROCARDIOGRAM TRACING: CPT | Performed by: EMERGENCY MEDICINE

## 2023-12-11 PROCEDURE — 70551 MRI BRAIN STEM W/O DYE: CPT

## 2023-12-11 PROCEDURE — 99285 EMERGENCY DEPT VISIT HI MDM: CPT

## 2023-12-11 PROCEDURE — 99223 1ST HOSP IP/OBS HIGH 75: CPT | Performed by: PSYCHIATRY & NEUROLOGY

## 2023-12-11 PROCEDURE — 99223 1ST HOSP IP/OBS HIGH 75: CPT

## 2023-12-11 PROCEDURE — 6370000000 HC RX 637 (ALT 250 FOR IP)

## 2023-12-11 PROCEDURE — 81003 URINALYSIS AUTO W/O SCOPE: CPT

## 2023-12-11 PROCEDURE — 1100000000 HC RM PRIVATE

## 2023-12-11 PROCEDURE — 96365 THER/PROPH/DIAG IV INF INIT: CPT

## 2023-12-11 PROCEDURE — 80053 COMPREHEN METABOLIC PANEL: CPT

## 2023-12-11 PROCEDURE — 70450 CT HEAD/BRAIN W/O DYE: CPT

## 2023-12-11 PROCEDURE — 71045 X-RAY EXAM CHEST 1 VIEW: CPT

## 2023-12-11 RX ORDER — LISINOPRIL 20 MG/1
40 TABLET ORAL ONCE
Status: COMPLETED | OUTPATIENT
Start: 2023-12-11 | End: 2023-12-11

## 2023-12-11 RX ORDER — PAROXETINE HYDROCHLORIDE 40 MG/1
40 TABLET, FILM COATED ORAL EVERY MORNING
COMMUNITY
Start: 2023-05-04

## 2023-12-11 RX ORDER — ACETAMINOPHEN 650 MG/1
650 SUPPOSITORY RECTAL EVERY 6 HOURS PRN
Status: DISCONTINUED | OUTPATIENT
Start: 2023-12-11 | End: 2023-12-13 | Stop reason: HOSPADM

## 2023-12-11 RX ORDER — ENOXAPARIN SODIUM 100 MG/ML
40 INJECTION SUBCUTANEOUS DAILY
Status: DISCONTINUED | OUTPATIENT
Start: 2023-12-12 | End: 2023-12-13 | Stop reason: HOSPADM

## 2023-12-11 RX ORDER — SODIUM CHLORIDE, SODIUM LACTATE, POTASSIUM CHLORIDE, AND CALCIUM CHLORIDE .6; .31; .03; .02 G/100ML; G/100ML; G/100ML; G/100ML
500 INJECTION, SOLUTION INTRAVENOUS ONCE
Status: COMPLETED | OUTPATIENT
Start: 2023-12-11 | End: 2023-12-12

## 2023-12-11 RX ORDER — LISINOPRIL 40 MG/1
40 TABLET ORAL DAILY
Status: DISCONTINUED | OUTPATIENT
Start: 2023-12-12 | End: 2023-12-13 | Stop reason: HOSPADM

## 2023-12-11 RX ORDER — MECLIZINE HCL 12.5 MG/1
25 TABLET ORAL 3 TIMES DAILY PRN
Status: DISCONTINUED | OUTPATIENT
Start: 2023-12-11 | End: 2023-12-13 | Stop reason: HOSPADM

## 2023-12-11 RX ORDER — IPRATROPIUM BROMIDE AND ALBUTEROL SULFATE 2.5; .5 MG/3ML; MG/3ML
1 SOLUTION RESPIRATORY (INHALATION) ONCE
Status: DISCONTINUED | OUTPATIENT
Start: 2023-12-11 | End: 2023-12-11

## 2023-12-11 RX ORDER — ACETAMINOPHEN 325 MG/1
650 TABLET ORAL EVERY 6 HOURS PRN
Status: DISCONTINUED | OUTPATIENT
Start: 2023-12-11 | End: 2023-12-13 | Stop reason: HOSPADM

## 2023-12-11 RX ORDER — MECLIZINE HCL 12.5 MG/1
12.5 TABLET ORAL 3 TIMES DAILY PRN
Status: DISCONTINUED | OUTPATIENT
Start: 2023-12-11 | End: 2023-12-11

## 2023-12-11 RX ORDER — MECLIZINE HCL 12.5 MG/1
25 TABLET ORAL EVERY 6 HOURS SCHEDULED
Status: DISCONTINUED | OUTPATIENT
Start: 2023-12-11 | End: 2023-12-11

## 2023-12-11 RX ORDER — ONDANSETRON 2 MG/ML
4 INJECTION INTRAMUSCULAR; INTRAVENOUS EVERY 6 HOURS PRN
Status: DISCONTINUED | OUTPATIENT
Start: 2023-12-11 | End: 2023-12-13 | Stop reason: HOSPADM

## 2023-12-11 RX ORDER — ONDANSETRON 4 MG/1
4 TABLET, ORALLY DISINTEGRATING ORAL EVERY 8 HOURS PRN
Status: DISCONTINUED | OUTPATIENT
Start: 2023-12-11 | End: 2023-12-13 | Stop reason: HOSPADM

## 2023-12-11 RX ORDER — HYDRALAZINE HYDROCHLORIDE 20 MG/ML
10 INJECTION INTRAMUSCULAR; INTRAVENOUS
Status: COMPLETED | OUTPATIENT
Start: 2023-12-11 | End: 2023-12-11

## 2023-12-11 RX ORDER — SODIUM CHLORIDE 0.9 % (FLUSH) 0.9 %
5-40 SYRINGE (ML) INJECTION EVERY 12 HOURS SCHEDULED
Status: DISCONTINUED | OUTPATIENT
Start: 2023-12-11 | End: 2023-12-13 | Stop reason: HOSPADM

## 2023-12-11 RX ORDER — POTASSIUM CHLORIDE 7.45 MG/ML
10 INJECTION INTRAVENOUS
Status: DISCONTINUED | OUTPATIENT
Start: 2023-12-11 | End: 2023-12-11

## 2023-12-11 RX ORDER — BENAZEPRIL HYDROCHLORIDE 5 MG/1
40 TABLET, FILM COATED ORAL ONCE
Status: DISCONTINUED | OUTPATIENT
Start: 2023-12-11 | End: 2023-12-11 | Stop reason: CLARIF

## 2023-12-11 RX ORDER — POLYETHYLENE GLYCOL 3350 17 G/17G
17 POWDER, FOR SOLUTION ORAL DAILY PRN
Status: DISCONTINUED | OUTPATIENT
Start: 2023-12-11 | End: 2023-12-13 | Stop reason: HOSPADM

## 2023-12-11 RX ORDER — ROSUVASTATIN CALCIUM 10 MG/1
10 TABLET, COATED ORAL DAILY
COMMUNITY
Start: 2022-07-27

## 2023-12-11 RX ORDER — HYDRALAZINE HYDROCHLORIDE 20 MG/ML
10 INJECTION INTRAMUSCULAR; INTRAVENOUS EVERY 6 HOURS PRN
Status: DISCONTINUED | OUTPATIENT
Start: 2023-12-11 | End: 2023-12-13 | Stop reason: HOSPADM

## 2023-12-11 RX ORDER — ROSUVASTATIN CALCIUM 10 MG/1
10 TABLET, COATED ORAL DAILY
Status: DISCONTINUED | OUTPATIENT
Start: 2023-12-12 | End: 2023-12-13 | Stop reason: HOSPADM

## 2023-12-11 RX ORDER — SODIUM CHLORIDE 0.9 % (FLUSH) 0.9 %
5-40 SYRINGE (ML) INJECTION PRN
Status: DISCONTINUED | OUTPATIENT
Start: 2023-12-11 | End: 2023-12-13 | Stop reason: HOSPADM

## 2023-12-11 RX ORDER — PAROXETINE 10 MG/1
40 TABLET, FILM COATED ORAL EVERY MORNING
Status: DISCONTINUED | OUTPATIENT
Start: 2023-12-12 | End: 2023-12-13 | Stop reason: HOSPADM

## 2023-12-11 RX ORDER — AMLODIPINE BESYLATE 5 MG/1
5 TABLET ORAL ONCE
Status: DISCONTINUED | OUTPATIENT
Start: 2023-12-11 | End: 2023-12-11

## 2023-12-11 RX ADMIN — HYDRALAZINE HYDROCHLORIDE 10 MG: 20 INJECTION, SOLUTION INTRAMUSCULAR; INTRAVENOUS at 23:15

## 2023-12-11 RX ADMIN — POTASSIUM BICARBONATE 40 MEQ: 782 TABLET, EFFERVESCENT ORAL at 14:33

## 2023-12-11 RX ADMIN — LISINOPRIL 40 MG: 20 TABLET ORAL at 14:01

## 2023-12-11 RX ADMIN — POTASSIUM CHLORIDE 10 MEQ: 7.46 INJECTION, SOLUTION INTRAVENOUS at 14:03

## 2023-12-11 RX ADMIN — SODIUM CHLORIDE, POTASSIUM CHLORIDE, SODIUM LACTATE AND CALCIUM CHLORIDE 500 ML: 600; 310; 30; 20 INJECTION, SOLUTION INTRAVENOUS at 14:34

## 2023-12-11 NOTE — CONSULTS
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 Around 9/2023, patient had COVID-19. She then had sore throat in 10/2023. When she saw her PCP at 11/14/2023, her BP was reported within normal range. At the end of 11/2023, patient began to have multiple episodes of vertigo, which was triggered by standing up. It was not associated with diarrhea, turning head bilaterally, ear fullness/heaviness, diarrhea, hearing loss, tinnitus, poor oral intake. She saw ENT without clear diagnosis. She had BP device at home, but she did not use it. She had one episode of headache yesterday evening. She went to see her doctor today, and was told to come to hospital ER, where she was found hypertensive with SBP> 200. Past Medical History:   Diagnosis Date    Allergic rhinitis     Arthritis     Chronic bronchitis (HCC)     Chronic sinusitis     Hypertension     Sleep apnea        No past surgical history on file. Current Facility-Administered Medications   Medication Dose Route Frequency Provider Last Rate Last Admin    meclizine (ANTIVERT) tablet 25 mg  25 mg Oral 4 times per day Nany Hartman MD         Current Outpatient Medications   Medication Sig Dispense Refill    benazepril (LOTENSIN) 40 MG tablet Take 40 mg by mouth daily      dilTIAZem HCl ER Coated Beads 300 MG TB24 Take by mouth      Fluticasone Propionate, Inhal, 50 MCG/ACT AEPB Inhale into the lungs      LORazepam (ATIVAN) 1 MG tablet Take by mouth every 4 hours as needed. meclizine (ANTIVERT) 25 MG tablet Take 1 tablet by mouth every 6 hours for the next 3 days. Then stop taking the meclizine. Restart the meclizine for 3 day intervals if vertigo/ dizziness returns.       pravastatin (PRAVACHOL) 40 MG tablet Take 40 mg by mouth nightly          Allergies   Allergen Reactions    Sulfur Hives    Codeine Nausea And ms    QTc Calculation (Bazett) 462 ms    P Axis 52 degrees    R Axis -46 degrees    T Axis 93 degrees    Diagnosis       Sinus rhythm with 1st degree AV block  Left axis deviation  Left ventricular hypertrophy with repolarization abnormality ( R in aVL ,   [place] product )  Cannot rule out Septal infarct (cited on or before 08-DEC-2020)  Possible Lateral infarct (cited on or before 08-DEC-2020)  Abnormal ECG  When compared with ECG of 23-APR-2022 17:06,  Vent. rate has increased BY  34 BPM     Urinalysis    Collection Time: 12/11/23  2:39 PM   Result Value Ref Range    Color, UA YELLOW      Appearance CLEAR      Specific Gravity, UA 1.006 1.005 - 1.030      pH, Urine 8.0 5.0 - 8.0      Protein, UA Negative NEG mg/dL    Glucose, UA Negative NEG mg/dL    Ketones, Urine Negative NEG mg/dL    Bilirubin Urine Negative NEG      Blood, Urine Negative NEG      Urobilinogen, Urine 1.0 0.2 - 1.0 EU/dL    Nitrite, Urine Negative NEG      Leukocyte Esterase, Urine Negative NEG         CT HEAD WO CONTRAST  Impression: No acute infarct, mass, nor hemorrhage. Atrophy. Small vessel disease. Focal sclerotic focus in the clivus without aggressive features. Impression  Moisés Gunter is a 81 y.o. female whose history and physical are consistent with orthostatic dizziness, hypertensive encephalopathy, PRES.       Plan:  MRI brain to rule out acute infarct  Gradually normalize /80 at daily reduction rate of < 20%  Check orthostatic hypotension: supine BP/pulse and standing BP/pulse    Addendum  MRI brain as reviewed by myself: no acute infarct      Total time 77 minutes with 40 minutes spent in counseling patient, friend, ER team.     Signed By: Jr Adkins MD. PhD, Art Feliz

## 2023-12-11 NOTE — ED NOTES
IV secondary to burning. Blood Pressure after administering home ACE-i remained >200 SBP. Followed up with Dr. Jeremy Sheriff of Neurology who agrees with Diagnosis at this time of Hypertensive Encephalopathy Syndrome. Orthostatic Vitals pending to determine if there is a component of Hypovolemic Orthostasis vs. Autonomic Orthostasis. Plan is to admit patient for observation. Our Goal overnight will be within the 180s SBP range, for no more than a reduction by 20% per 24 hours back to her mid-November baseline. SBPs in the ED ranged from 202 to 223. Overnight a calcium channel blocker may be most appropriate, diuretics are not recommended given prior intolerance and concern for hypovolemic orthostasis. Orthostatic Vitals Pending. Notable leukocytosis to 17, for which CXR was ordered. Spoke with Dr. Rona Kowalski who agreed with admitting the patient for observation i/s/o Hypertensive Emergency for gentle lowering of her Blood pressure. Patient was stable at this time for admission. ED Course as of 12/11/23 2107   Mon Dec 11, 2023   1310 My independent review of the EKG is sinus rhythm at 98. Borderline first-degree AV block. Left axis deviation. LVH with repolarization abnormality. No ST segment elevation. No change from 23 April 2022 [SH]   1312 EKG 12 Lead [SH]      ED Course User Index  [SH] Mode Steward MD       CONSULTS:  Neurology - Dr. Katrina Chambers      1. Hypertensive encephalopathy syndrome          DISPOSITION/PLAN   DISPOSITION  12/11/2023 08:48:06 PM      PATIENT REFERRED TO:  No follow-up provider specified.     DISCHARGE MEDICATIONS:  New Prescriptions    No medications on file     Controlled Substances Monitoring:          No data to display                (Please note that portions of this note were completed with a voice recognition program.  Efforts were made to edit the dictations but occasionally words are mis-transcribed.)    Susu Adorno MD  , [facility], [address] [place] [address]  PGY-1 / [address] [address] [facility]  Pager: [phone]     Sheryle Goldberg, MD  Resident  12/11/23 2122       Sheryle Goldberg, MD  Resident  12/11/23 [id number]

## 2023-12-11 NOTE — PROGRESS NOTES
Lisinopril 40 mg was therapeutically interchanged for Benazepril 40 mg per the P&T Committee approved Therapeutic Interchanges Policy.

## 2023-12-11 NOTE — ED TRIAGE NOTES
Patient bibems. EMS was called for vertigo. Afib on monitor for ems. Pt c/o vertigo this morning as well as some vertigo last week relieved by meclizine.

## 2023-12-12 LAB
ANION GAP SERPL CALC-SCNC: 6 MMOL/L (ref 3–18)
BASOPHILS # BLD: 0.1 K/UL (ref 0–0.1)
BASOPHILS NFR BLD: 1 % (ref 0–2)
BUN SERPL-MCNC: 14 MG/DL (ref 7–18)
BUN/CREAT SERPL: 18 (ref 12–20)
CALCIUM SERPL-MCNC: 9.3 MG/DL (ref 8.5–10.1)
CHLORIDE SERPL-SCNC: 108 MMOL/L (ref 100–111)
CO2 SERPL-SCNC: 25 MMOL/L (ref 21–32)
CREAT SERPL-MCNC: 0.8 MG/DL (ref 0.6–1.3)
DIFFERENTIAL METHOD BLD: ABNORMAL
EKG ATRIAL RATE: 98 BPM
EKG DIAGNOSIS: NORMAL
EKG P AXIS: 52 DEGREES
EKG P-R INTERVAL: 218 MS
EKG Q-T INTERVAL: 362 MS
EKG QRS DURATION: 94 MS
EKG QTC CALCULATION (BAZETT): 462 MS
EKG R AXIS: -46 DEGREES
EKG T AXIS: 93 DEGREES
EKG VENTRICULAR RATE: 98 BPM
EOSINOPHIL # BLD: 0.2 K/UL (ref 0–0.4)
EOSINOPHIL NFR BLD: 1 % (ref 0–5)
ERYTHROCYTE [DISTWIDTH] IN BLOOD BY AUTOMATED COUNT: 13.2 % (ref 11.6–14.5)
GLUCOSE SERPL-MCNC: 95 MG/DL (ref 74–99)
HCT VFR BLD AUTO: 43.9 % (ref 35–45)
HGB BLD-MCNC: 14.5 G/DL (ref 12–16)
IMM GRANULOCYTES # BLD AUTO: 0 K/UL (ref 0–0.04)
IMM GRANULOCYTES NFR BLD AUTO: 0 % (ref 0–0.5)
LYMPHOCYTES # BLD: 2.1 K/UL (ref 0.9–3.6)
LYMPHOCYTES NFR BLD: 16 % (ref 21–52)
MAGNESIUM SERPL-MCNC: 2 MG/DL (ref 1.6–2.6)
MCH RBC QN AUTO: 28.1 PG (ref 24–34)
MCHC RBC AUTO-ENTMCNC: 33 G/DL (ref 31–37)
MCV RBC AUTO: 85.1 FL (ref 78–100)
MONOCYTES # BLD: 1 K/UL (ref 0.05–1.2)
MONOCYTES NFR BLD: 8 % (ref 3–10)
NEUTS SEG # BLD: 10 K/UL (ref 1.8–8)
NEUTS SEG NFR BLD: 75 % (ref 40–73)
NRBC # BLD: 0 K/UL (ref 0–0.01)
NRBC BLD-RTO: 0 PER 100 WBC
PLATELET # BLD AUTO: 350 K/UL (ref 135–420)
PMV BLD AUTO: 10.9 FL (ref 9.2–11.8)
POTASSIUM SERPL-SCNC: 3.3 MMOL/L (ref 3.5–5.5)
RBC # BLD AUTO: 5.16 M/UL (ref 4.2–5.3)
SODIUM SERPL-SCNC: 139 MMOL/L (ref 136–145)
WBC # BLD AUTO: 13.4 K/UL (ref 4.6–13.2)

## 2023-12-12 PROCEDURE — 97530 THERAPEUTIC ACTIVITIES: CPT

## 2023-12-12 PROCEDURE — 6370000000 HC RX 637 (ALT 250 FOR IP)

## 2023-12-12 PROCEDURE — 6370000000 HC RX 637 (ALT 250 FOR IP): Performed by: INTERNAL MEDICINE

## 2023-12-12 PROCEDURE — 97162 PT EVAL MOD COMPLEX 30 MIN: CPT

## 2023-12-12 PROCEDURE — 99232 SBSQ HOSP IP/OBS MODERATE 35: CPT | Performed by: INTERNAL MEDICINE

## 2023-12-12 PROCEDURE — 1100000003 HC PRIVATE W/ TELEMETRY

## 2023-12-12 PROCEDURE — 2580000003 HC RX 258

## 2023-12-12 PROCEDURE — 93010 ELECTROCARDIOGRAM REPORT: CPT | Performed by: INTERNAL MEDICINE

## 2023-12-12 PROCEDURE — 97165 OT EVAL LOW COMPLEX 30 MIN: CPT

## 2023-12-12 PROCEDURE — 99233 SBSQ HOSP IP/OBS HIGH 50: CPT | Performed by: PSYCHIATRY & NEUROLOGY

## 2023-12-12 PROCEDURE — 83735 ASSAY OF MAGNESIUM: CPT

## 2023-12-12 PROCEDURE — 85025 COMPLETE CBC W/AUTO DIFF WBC: CPT

## 2023-12-12 PROCEDURE — 6360000002 HC RX W HCPCS

## 2023-12-12 PROCEDURE — 80048 BASIC METABOLIC PNL TOTAL CA: CPT

## 2023-12-12 RX ORDER — ATROPINE SULFATE 1 MG/ML
INJECTION, SOLUTION INTRAMUSCULAR; INTRAVENOUS; SUBCUTANEOUS
Status: DISCONTINUED
Start: 2023-12-12 | End: 2023-12-12 | Stop reason: WASHOUT

## 2023-12-12 RX ORDER — POTASSIUM CHLORIDE 20 MEQ/1
40 TABLET, EXTENDED RELEASE ORAL ONCE
Status: COMPLETED | OUTPATIENT
Start: 2023-12-12 | End: 2023-12-12

## 2023-12-12 RX ORDER — ATROPINE SULFATE 1 MG/ML
INJECTION, SOLUTION INTRAMUSCULAR; INTRAVENOUS; SUBCUTANEOUS
Status: DISPENSED
Start: 2023-12-12 | End: 2023-12-12

## 2023-12-12 RX ADMIN — HYDRALAZINE HYDROCHLORIDE 10 MG: 20 INJECTION INTRAMUSCULAR; INTRAVENOUS at 23:21

## 2023-12-12 RX ADMIN — SODIUM CHLORIDE, PRESERVATIVE FREE 10 ML: 5 INJECTION INTRAVENOUS at 22:00

## 2023-12-12 RX ADMIN — ACETAMINOPHEN 325MG 650 MG: 325 TABLET ORAL at 16:54

## 2023-12-12 RX ADMIN — SODIUM CHLORIDE, PRESERVATIVE FREE 10 ML: 5 INJECTION INTRAVENOUS at 10:19

## 2023-12-12 RX ADMIN — ACETAMINOPHEN 325MG 650 MG: 325 TABLET ORAL at 03:14

## 2023-12-12 RX ADMIN — POTASSIUM CHLORIDE 40 MEQ: 1500 TABLET, EXTENDED RELEASE ORAL at 16:54

## 2023-12-12 RX ADMIN — LISINOPRIL 40 MG: 20 TABLET ORAL at 10:18

## 2023-12-12 RX ADMIN — MECLIZINE 25 MG: 12.5 TABLET ORAL at 23:21

## 2023-12-12 RX ADMIN — PAROXETINE HYDROCHLORIDE 40 MG: 20 TABLET, FILM COATED ORAL at 10:18

## 2023-12-12 ASSESSMENT — PAIN SCALES - GENERAL
PAINLEVEL_OUTOF10: 0
PAINLEVEL_OUTOF10: 3
PAINLEVEL_OUTOF10: 0

## 2023-12-12 ASSESSMENT — PAIN - FUNCTIONAL ASSESSMENT: PAIN_FUNCTIONAL_ASSESSMENT: NONE - DENIES PAIN

## 2023-12-12 NOTE — ACP (ADVANCE CARE PLANNING)
Advance Care Planning     Advance Care Planning Inpatient Note  [facility] Department    Today's Date: 12/12/2023  Unit: [facility] EMERGENCY DEPT    Received request from . Upon review of chart and communication with care team, patient's decision making abilities are not in question. . Patient was/were present in the room during visit. Goals of ACP Conversation:  Discuss advance care planning documents    Health Care Decision Makers:     No healthcare decision makers have been documented.   Click here to complete [address] including selection of the Healthcare Decision Maker Relationship (ie \"Primary\")  Summary:  No Decision Maker named by patient at this time    Advance Care Planning Documents (Patient Wishes):  None     Assessment:  Patient seen in bed 1 of the emergency room where she will be admitted to the hospital from with Hypertensive encephalopathy    Interventions:  Patient DECLINED ACP conversation    Care Preferences Communicated:   No    Outcomes/Plan:      Electronically signed by Jostin Rodriguez., [address] [place] [address] on 12/12/2023 at 10:08 AM

## 2023-12-12 NOTE — H&P
History and Physical          Subjective     HPI: Javi Campbell is a 81 y.o. female with a PMHx of hypertension, sleep apnea who presented to the ED with complaints of intermittent vertigo. End of November about 3 weeks ago, patient began to have multiple episodes of vertigo, which was triggered by stand up. Not associated with turning head side by side, ear fullness/heaviness, diarrhea, hearing loss, tinnitus, poor PO intake. Does state it is resolved by meclizine. States she feels the room is spinning during these episodes. Patient saw ENT end of November, without clear diagnosis. Patient went to see her PCP today and was advised to come to the ED for further evaluation as her SBP was found to be > 200. Currently upon my evaluation, states she has not had any further episodes of headaches or dizziness since being in the ED. Denies chest pain, sob, fever, chills, nvd, abdominal pain, urinary symptoms, cough. Denies smoking, drinking, illicit drug use. In the ED, Temp 98.0, Resp 16-28, HR , /121-->209/86, 98% on RA.K 2.9, otherwise CMP without abnormality. Glucose 81. WBC 17.0, otherwise CBC without significant abnormality. UA negative. CT head negative. CXR pending formal read. MRI without acute infarct. EKG with NSR. Neurology consulted by the ED. Received 500 ml LR bolus, lisinopril 40 PO, effer K 40 meq, 10 meq IV K in the ED. Home meds reconciled with the patient  Code status discussed- FULL code     PMHx:  Past Medical History:   Diagnosis Date    Allergic rhinitis     Arthritis     Chronic bronchitis ([address])     Chronic sinusitis     Hypertension     Sleep apnea        PSurgHx:  No past surgical history on file. SocialHx:  Social History     Socioeconomic History    Marital status: Single   Tobacco Use    Smoking status: Never    Smokeless tobacco: Never   Substance and Sexual Activity    Alcohol use: No       FamilyHx:  No family history on file.     Home Medications:  Prior to

## 2023-12-12 NOTE — ED PROVIDER NOTES
I received the patient in turnover from Dr. Trent Torres at the end of his shift. She is an 80 y/o woman with pmh sig for hypertension, chronic bronchitis, obstructive sleep apnea, and vertigo who presented to the ED today with headache, dizziness, and elevated blood pressure greater than 200. At the time of turnover, we are awaiting the results of the patient's MRI.     Recent Results (from the past 24 hour(s))   CBC with Auto Differential    Collection Time: 12/11/23 12:21 PM   Result Value Ref Range    WBC 17.0 (H) 4.6 - 13.2 K/uL    RBC 5.30 4.20 - 5.30 M/uL    Hemoglobin 14.9 12.0 - 16.0 g/dL    Hematocrit 45.5 (H) 35.0 - 45.0 %    MCV 85.8 78.0 - 100.0 FL    MCH 28.1 24.0 - 34.0 PG    MCHC 32.7 31.0 - 37.0 g/dL    RDW 12.9 11.6 - 14.5 %    Platelets 563 452 - 422 K/uL    MPV 11.9 (H) 9.2 - 11.8 FL    Nucleated RBCs 0.0 0  WBC    nRBC 0.00 0.00 - 0.01 K/uL    Neutrophils % 78 (H) 40 - 73 %    Lymphocytes % 13 (L) 21 - 52 %    Monocytes % 8 3 - 10 %    Eosinophils % 1 0 - 5 %    Basophils % 1 0 - 2 %    Immature Granulocytes 1 (H) 0.0 - 0.5 %    Neutrophils Absolute 13.2 (H) 1.8 - 8.0 K/UL    Lymphocytes Absolute 2.1 0.9 - 3.6 K/UL    Monocytes Absolute 1.4 (H) 0.05 - 1.2 K/UL    Eosinophils Absolute 0.1 0.0 - 0.4 K/UL    Basophils Absolute 0.1 0.0 - 0.1 K/UL    Absolute Immature Granulocyte 0.1 (H) 0.00 - 0.04 K/UL    Differential Type AUTOMATED     Comprehensive Metabolic Panel    Collection Time: 12/11/23 12:21 PM   Result Value Ref Range    Sodium 139 136 - 145 mmol/L    Potassium 2.9 (LL) 3.5 - 5.5 mmol/L    Chloride 105 100 - 111 mmol/L    CO2 28 21 - 32 mmol/L    Anion Gap 6 3.0 - 18 mmol/L    Glucose 81 74 - 99 mg/dL    BUN 15 7.0 - 18 MG/DL    Creatinine 0.90 0.6 - 1.3 MG/DL    Bun/Cre Ratio 17 12 - 20      Est, Glom Filt Rate >60 >60 ml/min/1.73m2    Calcium 9.6 8.5 - 10.1 MG/DL    Total Bilirubin 0.4 0.2 - 1.0 MG/DL    ALT 19 13 - 56 U/L    AST 21 10 - 38 U/L    Alk Phosphatase 93 45 - 117 U/L

## 2023-12-12 NOTE — ED NOTES
Pt keeps removing bp cuff when rn leaves room. Pt also c/o of monitor making too much noise.  Alarms cut down to lowest volume at this time per pt request.      Jonh Gomes RN  12/12/23 0118

## 2023-12-12 NOTE — ED NOTES
HAND OVER REPORT GIVEN TO EVERARDO RUST ON ACCEPTING UNIT, SITUATION, BACKGROUND AND TREATMENT RECEIVED IN THE ED PROVIDED, ALL QUESTIONS AND CONCERN CLARIFIED, PATIENT UPDATED ON SAME.  PATIENT AWAITS Vivi Barron RN  12/12/23 1344

## 2023-12-12 NOTE — ED NOTES
Pt is alert and orientedx4, pt denies any pain at this time , Hooked to cardiac monitor, safety precautions implemented. Call bell within reach. Bed at its lowest position .      James [place], [place]  12/12/23 0925

## 2023-12-12 NOTE — ED NOTES
Reports given by Shelli Knife no further questions asked      Megan [place], [place]  12/12/23 8482

## 2023-12-13 ENCOUNTER — HOME HEALTH ADMISSION (OUTPATIENT)
Age: 88
End: 2023-12-13

## 2023-12-13 VITALS
WEIGHT: 177.91 LBS | HEIGHT: 67 IN | TEMPERATURE: 98.9 F | OXYGEN SATURATION: 95 % | RESPIRATION RATE: 18 BRPM | HEART RATE: 83 BPM | BODY MASS INDEX: 27.92 KG/M2 | SYSTOLIC BLOOD PRESSURE: 133 MMHG | DIASTOLIC BLOOD PRESSURE: 71 MMHG

## 2023-12-13 LAB
ANION GAP SERPL CALC-SCNC: 6 MMOL/L (ref 3–18)
BASOPHILS # BLD: 0.1 K/UL (ref 0–0.1)
BASOPHILS NFR BLD: 1 % (ref 0–2)
BUN SERPL-MCNC: 20 MG/DL (ref 7–18)
BUN/CREAT SERPL: 22 (ref 12–20)
CALCIUM SERPL-MCNC: 9.1 MG/DL (ref 8.5–10.1)
CHLORIDE SERPL-SCNC: 109 MMOL/L (ref 100–111)
CO2 SERPL-SCNC: 24 MMOL/L (ref 21–32)
CREAT SERPL-MCNC: 0.89 MG/DL (ref 0.6–1.3)
DIFFERENTIAL METHOD BLD: ABNORMAL
EOSINOPHIL # BLD: 0.3 K/UL (ref 0–0.4)
EOSINOPHIL NFR BLD: 2 % (ref 0–5)
ERYTHROCYTE [DISTWIDTH] IN BLOOD BY AUTOMATED COUNT: 13.3 % (ref 11.6–14.5)
GLUCOSE SERPL-MCNC: 98 MG/DL (ref 74–99)
HCT VFR BLD AUTO: 42.9 % (ref 35–45)
HGB BLD-MCNC: 14 G/DL (ref 12–16)
IMM GRANULOCYTES # BLD AUTO: 0.1 K/UL (ref 0–0.04)
IMM GRANULOCYTES NFR BLD AUTO: 0 % (ref 0–0.5)
LYMPHOCYTES # BLD: 2 K/UL (ref 0.9–3.6)
LYMPHOCYTES NFR BLD: 15 % (ref 21–52)
MAGNESIUM SERPL-MCNC: 2.1 MG/DL (ref 1.6–2.6)
MCH RBC QN AUTO: 27.9 PG (ref 24–34)
MCHC RBC AUTO-ENTMCNC: 32.6 G/DL (ref 31–37)
MCV RBC AUTO: 85.6 FL (ref 78–100)
MONOCYTES # BLD: 1.2 K/UL (ref 0.05–1.2)
MONOCYTES NFR BLD: 9 % (ref 3–10)
NEUTS SEG # BLD: 10.3 K/UL (ref 1.8–8)
NEUTS SEG NFR BLD: 74 % (ref 40–73)
NRBC # BLD: 0 K/UL (ref 0–0.01)
NRBC BLD-RTO: 0 PER 100 WBC
PLATELET # BLD AUTO: 344 K/UL (ref 135–420)
PMV BLD AUTO: 11.9 FL (ref 9.2–11.8)
POTASSIUM SERPL-SCNC: 3.3 MMOL/L (ref 3.5–5.5)
RBC # BLD AUTO: 5.01 M/UL (ref 4.2–5.3)
SODIUM SERPL-SCNC: 139 MMOL/L (ref 136–145)
WBC # BLD AUTO: 14 K/UL (ref 4.6–13.2)

## 2023-12-13 PROCEDURE — 2580000003 HC RX 258

## 2023-12-13 PROCEDURE — 97530 THERAPEUTIC ACTIVITIES: CPT

## 2023-12-13 PROCEDURE — 6370000000 HC RX 637 (ALT 250 FOR IP): Performed by: INTERNAL MEDICINE

## 2023-12-13 PROCEDURE — 94761 N-INVAS EAR/PLS OXIMETRY MLT: CPT

## 2023-12-13 PROCEDURE — 6360000002 HC RX W HCPCS

## 2023-12-13 PROCEDURE — 6370000000 HC RX 637 (ALT 250 FOR IP)

## 2023-12-13 PROCEDURE — 85025 COMPLETE CBC W/AUTO DIFF WBC: CPT

## 2023-12-13 PROCEDURE — 97535 SELF CARE MNGMENT TRAINING: CPT

## 2023-12-13 PROCEDURE — 83735 ASSAY OF MAGNESIUM: CPT

## 2023-12-13 PROCEDURE — 80048 BASIC METABOLIC PNL TOTAL CA: CPT

## 2023-12-13 PROCEDURE — 36415 COLL VENOUS BLD VENIPUNCTURE: CPT

## 2023-12-13 RX ORDER — LORAZEPAM 0.5 MG/1
0.5 TABLET ORAL ONCE
Status: COMPLETED | OUTPATIENT
Start: 2023-12-13 | End: 2023-12-13

## 2023-12-13 RX ORDER — POTASSIUM CHLORIDE 20 MEQ/1
40 TABLET, EXTENDED RELEASE ORAL ONCE
Status: COMPLETED | OUTPATIENT
Start: 2023-12-13 | End: 2023-12-13

## 2023-12-13 RX ORDER — LORAZEPAM 1 MG/1
1 TABLET ORAL EVERY EVENING
Status: DISCONTINUED | OUTPATIENT
Start: 2023-12-13 | End: 2023-12-13 | Stop reason: HOSPADM

## 2023-12-13 RX ORDER — AMLODIPINE BESYLATE 5 MG/1
2.5 TABLET ORAL DAILY
Qty: 30 TABLET | Refills: 3 | Status: SHIPPED | OUTPATIENT
Start: 2023-12-13

## 2023-12-13 RX ADMIN — LORAZEPAM 0.5 MG: 0.5 TABLET ORAL at 06:13

## 2023-12-13 RX ADMIN — SODIUM CHLORIDE, PRESERVATIVE FREE 10 ML: 5 INJECTION INTRAVENOUS at 10:00

## 2023-12-13 RX ADMIN — POTASSIUM CHLORIDE 40 MEQ: 1500 TABLET, EXTENDED RELEASE ORAL at 06:14

## 2023-12-13 RX ADMIN — PAROXETINE HYDROCHLORIDE 40 MG: 20 TABLET, FILM COATED ORAL at 09:58

## 2023-12-13 RX ADMIN — ONDANSETRON 4 MG: 4 TABLET, ORALLY DISINTEGRATING ORAL at 06:13

## 2023-12-13 RX ADMIN — ENOXAPARIN SODIUM 40 MG: 100 INJECTION SUBCUTANEOUS at 09:58

## 2023-12-13 RX ADMIN — HYDRALAZINE HYDROCHLORIDE 10 MG: 20 INJECTION INTRAMUSCULAR; INTRAVENOUS at 05:22

## 2023-12-13 RX ADMIN — LISINOPRIL 40 MG: 20 TABLET ORAL at 09:59

## 2023-12-13 ASSESSMENT — PAIN SCALES - GENERAL: PAINLEVEL_OUTOF10: 0

## 2023-12-13 NOTE — PROGRESS NOTES
Nurse assessed patient while in room. Patient is calm and cooperative during assessment. She states she is ready to go home and hasn't been feeling any vertigo. Patient states she lives home alone and has support from friends and neighbors. Care plan is resolved at this time. MD notified and will review.

## 2023-12-13 NOTE — HOME CARE
Received home health referral for [facility] for (SN, PT, OT). Discharge order noted for today. Spoke with patient in room with friend/neighbor at bedside. Explained home health care services and routines for PT/OT. Demographics verified including insurance, phone and address confirmed. Patient declined [address] services at first.  Then changes decision and agrees to have PT/OT. Caregivers available:  patient resides in a senior citizen community apartments. Friends and neighbors available to assist.  Orders noted and arranged and sent to central intake and scheduling. Late Entry:    Received verbal order to add SN per Dr Mack Estimable via perfect serve. 12/14/23: Added SN to [address] order. Notified Central Scheduling and Intake.         -   Lola Tsai LPN  [facility]

## 2023-12-13 NOTE — CARE COORDINATION
Case Management Assessment  Initial Evaluation    Date/Time of Evaluation: 12/13/2023 11:25 AM  Assessment Completed by: Sonido Escamilla    If patient is discharged prior to next notation, then this note serves as note for discharge by case management. 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 (P) Yes    Chart Reviewed: Yes      History Provided by: (P) Patient  Patient Orientation: (P) Alert and Oriented    Patient Cognition: (P) Alert    Hospitalization in the last 30 days (Readmission):  No    If yes, Readmission Assessment in CM Navigator will be completed. Advance Directives:      Code Status: Full Code   Patient's Primary Decision Maker is: (P) Named in Scanned ACP Document      Discharge Planning:    Patient lives with: Alone Type of Home: Apartment  Primary Care Giver: (P) Self  Patient Support Systems include: (P) Friends/Neighbors   Current Financial resources:    Current community resources:    Current services prior to admission: None            Current DME:              Type of Home Care services:  None    ADLS  Prior functional level: (P) Independent in ADLs/IADLs  Current functional level: (P) Independent in ADLs/IADLs    PT AM-PAC:   /24  OT AM-PAC: 18 /24    Family can provide assistance at DC: Would you like Case Management to discuss the discharge plan with any other family members/significant others, and if so, who?     Plans to Return to Present Housing: (P) Yes  Potential Assistance needed at discharge: N/A            Potential DME:  tbd  Patient expects to discharge to: Apartment      Financial    Payor: MEDICARE / Plan: MEDICARE PART A AND B / Product Type: *No Product type*

## 2023-12-13 NOTE — DISCHARGE SUMMARY
[facility]  [facility]       Patient: Antonia Toussaint Age: 81 y.o. : 1935 MR#: [id number] [id number]: [id number]  PCP on record: Rebeca Nicholas MD  Admit date: 2023  Discharge date: 2023    Consults:  -ROLAND Robles,-neurology  -   Procedures:  -     Significant Diagnostic Studies: -CT head on 23: IMPRESSION:     No acute infarct, mass, nor hemorrhage. Atrophy. Small vessel disease. Focal sclerotic focus in the clivus without aggressive features. -  MRI Result (most recent):  MRI BRAIN WO CONTRAST 2023    Narrative  EXAM: MRI of the Head without contrast    INDICATION: C/F Cerebellar Hemorrhage . Hypertensive urgency. TECHNIQUE: MRI of the head without IV contrast. Multiplanar multisequence MR  images of the brain without contrast.    IV contrast:  None    COMPARISON: Head CT dated 2023    FINDINGS: No focus of restricted diffusion appreciated. The ventricles and sulci  are symmetric but moderately enlarged. Moderate multifocal white matter  hyperintensity on FLAIR weighted imaging is noted. No midline shift, mass  effect, or mass lesion appreciated. The grey-white junction is intact. No  evidence for an acute infarct identified. No focus of abnormal susceptibility  appreciated. The vascular flow voids are intact. The cerebellopontine angles  are unremarkable. The visualized internal auditory canals and semicircular  canals are unremarkable. The pituitary is normal. The mastoid air cells are  unremarkable. The visualized paranasal sinuses are unremarkable. The orbits are  unremarkable. The scalp and skull are unremarkable. Bone island in the clivus. Impression  1. No evidence of acute infarct. 2.  Moderate parenchymal volume loss and chronic small vessel schema changes.       Discharge Diagnoses: -Vertigo  -Hypertensive emergency  -Hypokalemia  -Leukocytosis                                          Patient Active 14.0 12.0 - 16.0 g/dL    Hematocrit 42.9 35.0 - 45.0 %    MCV 85.6 78.0 - 100.0 FL    MCH 27.9 24.0 - 34.0 PG    MCHC 32.6 31.0 - 37.0 g/dL    RDW 13.3 11.6 - 14.5 %    Platelets 109 055 - 999 K/uL    MPV 11.9 (H) 9.2 - 11.8 FL    Nucleated RBCs 0.0 0  WBC    nRBC 0.00 0.00 - 0.01 K/uL    Neutrophils % 74 (H) 40 - 73 %    Lymphocytes % 15 (L) 21 - 52 %    Monocytes % 9 3 - 10 %    Eosinophils % 2 0 - 5 %    Basophils % 1 0 - 2 %    Immature Granulocytes 0 0.0 - 0.5 %    Neutrophils Absolute 10.3 (H) 1.8 - 8.0 K/UL    Lymphocytes Absolute 2.0 0.9 - 3.6 K/UL    Monocytes Absolute 1.2 0.05 - 1.2 K/UL    Eosinophils Absolute 0.3 0.0 - 0.4 K/UL    Basophils Absolute 0.1 0.0 - 0.1 K/UL    Absolute Immature Granulocyte 0.1 (H) 0.00 - 0.04 K/UL    Differential Type AUTOMATED     Magnesium    Collection Time: 12/13/23  3:31 AM   Result Value Ref Range    Magnesium 2.1 1.6 - 2.6 mg/dL     Additional Data Reviewed:     Condition:   Disposition:    []Home   []Home with Home Health   []SNF/NH   []Rehab   []Home with family   []Alternate Facility:____________________      Discharge Medications:     Current Discharge Medication List        START taking these medications    Details   amLODIPine (NORVASC) 5 MG tablet Take 0.5 tablets by mouth daily  Qty: 30 tablet, Refills: 3           CONTINUE these medications which have NOT CHANGED    Details   PARoxetine (PAXIL) 40 MG tablet Take 1 tablet by mouth every morning      rosuvastatin (CRESTOR) 10 MG tablet Take 1 tablet by mouth daily      benazepril (LOTENSIN) 40 MG tablet Take 40 mg by mouth daily      Fluticasone Propionate, Inhal, 50 MCG/ACT AEPB Inhale into the lungs      LORazepam (ATIVAN) 1 MG tablet Take by mouth every 4 hours as needed. meclizine (ANTIVERT) 25 MG tablet Take 1 tablet by mouth every 6 hours for the next 3 days. Then stop taking the meclizine. Restart the meclizine for 3 day intervals if vertigo/ dizziness returns.            STOP taking these

## 2023-12-13 NOTE — PLAN OF CARE
Problem: Discharge Planning  Goal: Discharge to home or other facility with appropriate resources  Outcome: Progressing  Flowsheets (Taken 12/12/2023 1523)  Discharge to home or other facility with appropriate resources:   Identify barriers to discharge with patient and caregiver   Refer to discharge planning if patient needs post-hospital services based on physician order or complex needs related to functional status, cognitive ability or social support system   Identify discharge learning needs (meds, wound care, etc)   Arrange for needed discharge resources and transportation as appropriate   Arrange for interpreters to assist at discharge as needed     Problem: Safety - Adult  Goal: Free from fall injury  Outcome: Progressing     Problem: ABCDS Injury Assessment  Goal: Absence of physical injury  Outcome: Progressing     Problem: Skin/Tissue Integrity  Goal: Absence of new skin breakdown  Description: 1. Monitor for areas of redness and/or skin breakdown  2. Assess vascular access sites hourly  3. Every 4-6 hours minimum:  Change oxygen saturation probe site  4. Every 4-6 hours:  If on nasal continuous positive airway pressure, respiratory therapy assess nares and determine need for appliance change or resting period.   Outcome: Progressing
Problem: Occupational Therapy - Adult  Goal: By Discharge: Performs self-care activities at highest level of function for planned discharge setting. See evaluation for individualized goals. Description: Occupational Therapy Goals:  Initiated 12/12/2023 to be met within 7-10 days. 1.  Patient will perform lower body dressing with modified independence. 2.  Patient will perform functional task in standing for 3 minutes with supervision for balance. 3.  Patient will perform toilet transfers with modified independence. 4.  Patient will perform all aspects of toileting with modified independence. 5.  Patient will participate in upper extremity therapeutic exercise/activities with modified independence for 8-10 minutes to increase strength/endurance for ADLs. PLOF: Patient was modified independent with basic self care tasks and was recently using a SPC/RW for functional mobility secondary to dizziness/lightheadedness. She lives alone in an accessible apartment. 12/13/2023 1201 by KAMLA Chavez  Outcome: Progressing     Care plan resolved. Discharge pending .
Problem: Occupational Therapy - Adult  Goal: By Discharge: Performs self-care activities at highest level of function for planned discharge setting. See evaluation for individualized goals. Description: Occupational Therapy Goals:  Initiated 12/12/2023 to be met within 7-10 days. 1.  Patient will perform lower body dressing with modified independence. 2.  Patient will perform functional task in standing for 3 minutes with supervision for balance. 3.  Patient will perform toilet transfers with modified independence. 4.  Patient will perform all aspects of toileting with modified independence. 5.  Patient will participate in upper extremity therapeutic exercise/activities with modified independence for 8-10 minutes to increase strength/endurance for ADLs. PLOF: Patient was modified independent with basic self care tasks and was recently using a SPC/RW for functional mobility secondary to dizziness/lightheadedness. She lives alone in an accessible apartment. 12/13/2023 1201 by KAMLA Farley  Outcome: Progressing  12/12/2023 2217 by Walden Saint, OT  Outcome: Progressing   OCCUPATIONAL THERAPY TREATMENT    Patient: Sanjay Antonio (46 y.o. 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 ASSESSMENT:    Pt presents supine in bed stating she is not feeling well. Pt's face seems flushed and lips dry with decreased appetite, breakfast tray untouched. Provided pt with ice water and pt does drink ~ a cup of water during tx. Pt appears in distress, BP is checked at 144/66 with  in right arm in supine, then seated 148/68 with  seated on EOB in left UE. Pt is in increased p!  To the level of 8 in arms during checking BP both occasions second time pt is crying from p!. Notified nursing and
Problem: Occupational Therapy - Adult  Goal: By Discharge: Performs self-care activities at highest level of function for planned discharge setting. See evaluation for individualized goals. Description: Occupational Therapy Goals:  Initiated 12/12/2023 to be met within 7-10 days. 1.  Patient will perform lower body dressing with modified independence. 2.  Patient will perform functional task in standing for 3 minutes with supervision for balance. 3.  Patient will perform toilet transfers with modified independence. 4.  Patient will perform all aspects of toileting with modified independence. 5.  Patient will participate in upper extremity therapeutic exercise/activities with modified independence for 8-10 minutes to increase strength/endurance for ADLs. PLOF: Patient was modified independent with basic self care tasks and was recently using a SPC/RW for functional mobility secondary to dizziness/lightheadedness. She lives alone in an accessible apartment. Outcome: Progressing   OCCUPATIONAL THERAPY EVALUATION    Patient: Kiesha Prieto (69 y.o. 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 She notes a progressive decline in function over time. Patient able to perform basic self care tasks while seated but needs increased assist for completion in standing secondary to poor balance. HR noted to be in the low 100's throughout session. She is motivated to care for herself and increase her independence. Patient returned to supine on stretcher at the end of the session with all needs met. DEFICITS/IMPAIRMENTS:  Performance deficits / Impairments: Decreased functional mobility ; Decreased ADL status; Decreased balance;Decreased strength    Patient will benefit from skilled intervention to address the above
Layout: One level  Home Access: Elevator    Critical Behavior:  Orientation  Orientation Level: Oriented X4    Strength (BLE):    Strength:  Within functional limits    Range Of Motion (BLE):  AROM: Within functional limits    Functional Mobility:  Bed Mobility:  Bed Mobility Training  Bed Mobility Training: Yes  Supine to Sit: Supervision  Sit to Supine: Supervision  Transfers:  Transfer Training  Transfer Training: Yes  Sit to Stand: Supervision  Stand to Sit: Supervision  Balance:   Balance  Sitting: Intact  Standing: Impaired  Standing - Static: Good  Standing - Dynamic: Good    Pain:  Pain level pre-treatment: 0/10   Pain level post-treatment: 0/10     Activity Tolerance:   Activity Tolerance: Patient tolerated evaluation without incident    After treatment:   []         Patient left in no apparent distress sitting up in chair  [x]         Patient left in no apparent distress in bed  [x]         Call bell left within reach  []         Nursing notified  []         Caregiver present  []         Bed alarm activated  []         Chair alarm activated  []         SCDs applied    COMMUNICATION/EDUCATION:   Patient Education  Education Given To: Patient  Education Provided: Role of Therapy;Plan of Care  Education Method: Demonstration;Verbal;Teach Back  Barriers to Learning: None  Education Outcome: Verbalized understanding;Demonstrated understanding;Continued education needed    Thank you for this referral.  Miley Villa, PT  Minutes: 12    Eval Complexity: Decision Making: Medium Complexity

## 2023-12-14 ENCOUNTER — HOME CARE VISIT (OUTPATIENT)
Age: 88
End: 2023-12-14

## 2023-12-16 PROCEDURE — 0221000100 HH NO PAY CLAIM PROCEDURE

## 2023-12-20 NOTE — PROGRESS NOTES
Physician Progress Note      PATIENT:               Killian Dhillon  CSN #:                  [id number]  :                       1935  ADMIT DATE:       2023 12:09 PM  [address] [place] [address] DATE:        2023 5:26 PM  RESPONDING  PROVIDER #:        Ahmet Mae MD          QUERY TEXT:    Patient admitted with Hypertensive emergency . Noted documentation of   Hypertensive emergency in MD PNs on H+P and QD since admission. In order to   support the diagnosis of , please include additional clinical indicators in   your documentation. Or please document if the diagnosis of Hypertensive   emergency  has been ruled out or downgraded to urgencey after further study. The medical record reflects the following:    Risk Factors: Admitted - 2023 [age]year old female w/ h/o htn on bp   meds who reports compliance admitted after she presented w/ c/op intermittent   vertigo. Bps on initial eval 210/121. Pt states she continues to have nausea. States she feels she's not ready to go home, still not feeling well enoug        Clinical Indicators: H+P Assessment : Hypertensive Urgency/Encephalopathy   syndrome on arrivalVS Temp 98.0, Resp 16-28, HR , /121-->209/86,   98% on RA  LABS -K 2.9, otherwise CMP without abnormality. Glucose 81. WBC 17.0,   otherwise CBC without significant abnormality. UA negative. CT head negative. CXR pending formal read. MRI without acute infarct. EKG with NSR. Neurology   consulted by the ED. Received 500 ml LR bolus, lisinopril 40 PO, effer K 40 meq, 10 meq IV K in the   ED.     Treatment:  CT head, MRI brain negative for acute infarct , Will order PRN IV   Hydralazine to reduce BP gradually, goal  ,Check orthostatic vitals   ,PRN Meclizine ,Resume appropriate home meds- lisinopril, paroxetine, Crestor   ,Neurology consulted- appreciate assistance ,OT/PT    Thank you  Lina Caal RN [facility] BLAKE  , [facility]  [email or web address]      Hypertensive Urgency: Defined

## 2023-12-23 ENCOUNTER — HOME CARE VISIT (OUTPATIENT)
Age: 88
End: 2023-12-23

## 2023-12-28 ENCOUNTER — HOME CARE VISIT (OUTPATIENT)
Age: 88
End: 2023-12-28
Payer: MEDICARE

## 2024-01-02 ENCOUNTER — HOME CARE VISIT (OUTPATIENT)
Age: 89
End: 2024-01-02

## 2024-01-02 VITALS
DIASTOLIC BLOOD PRESSURE: 82 MMHG | RESPIRATION RATE: 18 BRPM | HEART RATE: 68 BPM | TEMPERATURE: 97.9 F | SYSTOLIC BLOOD PRESSURE: 138 MMHG | OXYGEN SATURATION: 98 %

## 2024-01-02 PROCEDURE — G0151 HHCP-SERV OF PT,EA 15 MIN: HCPCS

## 2024-01-02 ASSESSMENT — ENCOUNTER SYMPTOMS: PAIN LOCATION - PAIN QUALITY: TENDERNESS

## 2024-01-03 NOTE — HOME HEALTH
PT eval/DC  Pt is an  89 y/o female who went to ED with diagnosis of HTN.    Pt  is referred to home care PT.  PMH:  Oct 2023:  Covid. strepthroat;  November 2023: vertigo;  Allergic rhinitis; Arthritis;   Chronic bronchitis;  Chronic sinusitis; Hypertension;  Sleep apnea.   PLOF/living environment:  ind and ambulatory;  driving;  retired;  lives alone in a third floor apartment;    PREC: fall risk;     S:  Pt. denies falls  and denies change in meds since SOC on 12/16/23.   Pt's goal in PT is  not verbalized.  Pt said that she has been driving to grocery and to her doctor's appointments.  O: Pain:  see pain assessment  Integumentary:  intact  ROM:  all peripheral joints = WNL.  MMT:  BUE = 4-5/5;  BLE = 4+/5.  Functional strength test:  FTSTS:  21 sec, indicating  good BLE funtional strength.  Bed  mobility:  sit <> supine =  ind  ;  rolling =  ind  ;  Transfers:  sit <> stand from multiple surfaces  =  ind.  Balance:  Tinetti  = 28 / 28, indicating no fall risk;  TUG = 11.8  seconds indicating  no fall risk;  Gait:  300 ft with ind using no AD exhibiting good gait pattern;  Had to stop 4x to take deep breaths.    Endurance: 6MWT:  300 ft,  O2 sats = 99%,  indicating good walking endurance.  Ambulation program:  instructed to do 6MWT 2x/day.    Patient education:   Fall risk reduction strategies = use non skid shoes.  Maintenance of skin integrity:  change body position every 1-2 hours.   Physical activity:  Ambulate once every hour  with supervision, during daytime;  do 6MWT 2x/day.    Patient level of understanding of education provided: verbalized.  Patient response to treatment:  verbalized compliance.  Caregiver involvement/assistance needed: friend Jennifer.  A:  Pt is diagnosed with HTN  and is at her baseline level.  Home care PT is not indicated at this time.  It was mentioned to pt that she can benefit from OPPT but she wants to think about it at this time.  P:  PT frequency 1w1 for PT eval only.  She

## 2024-02-09 ENCOUNTER — OFFICE VISIT (OUTPATIENT)
Age: 89
End: 2024-02-09
Payer: MEDICARE

## 2024-02-09 VITALS
BODY MASS INDEX: 26.95 KG/M2 | WEIGHT: 167 LBS | DIASTOLIC BLOOD PRESSURE: 90 MMHG | HEART RATE: 89 BPM | SYSTOLIC BLOOD PRESSURE: 190 MMHG | OXYGEN SATURATION: 96 %

## 2024-02-09 DIAGNOSIS — R42 ORTHOSTATIC DIZZINESS: ICD-10-CM

## 2024-02-09 DIAGNOSIS — I10 HYPERTENSION, UNSPECIFIED TYPE: Primary | ICD-10-CM

## 2024-02-09 PROCEDURE — G8484 FLU IMMUNIZE NO ADMIN: HCPCS | Performed by: INTERNAL MEDICINE

## 2024-02-09 PROCEDURE — 4004F PT TOBACCO SCREEN RCVD TLK: CPT | Performed by: INTERNAL MEDICINE

## 2024-02-09 PROCEDURE — 1123F ACP DISCUSS/DSCN MKR DOCD: CPT | Performed by: INTERNAL MEDICINE

## 2024-02-09 PROCEDURE — 1090F PRES/ABSN URINE INCON ASSESS: CPT | Performed by: INTERNAL MEDICINE

## 2024-02-09 PROCEDURE — 99204 OFFICE O/P NEW MOD 45 MIN: CPT | Performed by: INTERNAL MEDICINE

## 2024-02-09 PROCEDURE — G8428 CUR MEDS NOT DOCUMENT: HCPCS | Performed by: INTERNAL MEDICINE

## 2024-02-09 PROCEDURE — G8419 CALC BMI OUT NRM PARAM NOF/U: HCPCS | Performed by: INTERNAL MEDICINE

## 2024-02-09 RX ORDER — CARVEDILOL 12.5 MG/1
12.5 TABLET ORAL 2 TIMES DAILY
Qty: 180 TABLET | Refills: 3 | Status: SHIPPED | OUTPATIENT
Start: 2024-02-09

## 2024-02-09 RX ORDER — DILTIAZEM HYDROCHLORIDE 300 MG/1
CAPSULE, COATED, EXTENDED RELEASE ORAL
COMMUNITY
Start: 2024-01-09 | End: 2024-02-09

## 2024-02-09 NOTE — PROGRESS NOTES
vertigo/ dizziness returns.     No current facility-administered medications for this visit.       No past surgical history on file.    Allergies and Sensitivities:  Allergies   Allergen Reactions    Sulfur Hives    Codeine Nausea And Vomiting       Family History:  No family history on file.    Social History:  Social History     Tobacco Use    Smoking status: Never    Smokeless tobacco: Never   Substance Use Topics    Alcohol use: No     She  reports that she has never smoked. She has never used smokeless tobacco.  She  reports no history of alcohol use.    Physical Exam:  BP Readings from Last 3 Encounters:   02/09/24 (!) 190/90   01/02/24 138/82   12/19/23 (!) 156/78         Pulse Readings from Last 3 Encounters:   02/09/24 89   01/02/24 68   12/19/23 94          Wt Readings from Last 3 Encounters:   02/09/24 75.8 kg (167 lb)   12/19/23 72.6 kg (160 lb)   12/13/23 80.7 kg (177 lb 14.6 oz)       Constitutional: Oriented to person, place, and time.   HENT: Head: Normocephalic and atraumatic. Eyes: Conjunctivae and extraocular motions are normal.   Neck: No JVD present. Carotid bruit is not appreciated.   Cardiovascular: Regular rhythm.   No murmur, gallop or rubs appreciated  Lung: Breath sounds normal. No respiratory distress. No ronchi or rales appreciated  Abdominal: No tenderness. No rebound and no guarding.   Musculoskeletal: There is no lower extremity edema. No cynosis  Lymphadenopathy:  No cervical or supraclavicular adenopathy appriciated.   Neurological: No gross motor deficit noted.  Skin: No visible skin rash noted.   No Ear discharge noted  Psychiatric: Normal mood and affect.     LABS:   @  Lab Results   Component Value Date/Time    WBC 12.5 12/19/2023 01:55 PM    HGB 14.7 12/19/2023 01:55 PM    HCT 45.4 12/19/2023 01:55 PM     12/19/2023 01:55 PM    MCV 87.0 12/19/2023 01:55 PM     Lab Results   Component Value Date/Time     12/19/2023 01:55 PM    K 4.2 12/19/2023 01:55 PM

## 2024-02-19 ENCOUNTER — TELEPHONE (OUTPATIENT)
Age: 89
End: 2024-02-19

## 2024-02-19 NOTE — TELEPHONE ENCOUNTER
Pt was given carvedilol from her NP appt with you, she started having severe itchiness all over her body. After calling the after hrs line, they advised she stopped taking carvedilol.

## 2024-02-23 ENCOUNTER — OFFICE VISIT (OUTPATIENT)
Age: 89
End: 2024-02-23
Payer: MEDICARE

## 2024-02-23 VITALS — HEART RATE: 49 BPM | DIASTOLIC BLOOD PRESSURE: 100 MMHG | SYSTOLIC BLOOD PRESSURE: 140 MMHG | OXYGEN SATURATION: 89 %

## 2024-02-23 DIAGNOSIS — I10 HYPERTENSION, UNSPECIFIED TYPE: Primary | ICD-10-CM

## 2024-02-23 PROCEDURE — 99214 OFFICE O/P EST MOD 30 MIN: CPT | Performed by: INTERNAL MEDICINE

## 2024-02-23 PROCEDURE — 1090F PRES/ABSN URINE INCON ASSESS: CPT | Performed by: INTERNAL MEDICINE

## 2024-02-23 PROCEDURE — 4004F PT TOBACCO SCREEN RCVD TLK: CPT | Performed by: INTERNAL MEDICINE

## 2024-02-23 PROCEDURE — G8428 CUR MEDS NOT DOCUMENT: HCPCS | Performed by: INTERNAL MEDICINE

## 2024-02-23 PROCEDURE — G8419 CALC BMI OUT NRM PARAM NOF/U: HCPCS | Performed by: INTERNAL MEDICINE

## 2024-02-23 PROCEDURE — G8484 FLU IMMUNIZE NO ADMIN: HCPCS | Performed by: INTERNAL MEDICINE

## 2024-02-23 PROCEDURE — 1123F ACP DISCUSS/DSCN MKR DOCD: CPT | Performed by: INTERNAL MEDICINE

## 2024-02-23 RX ORDER — AMLODIPINE BESYLATE 10 MG/1
10 TABLET ORAL DAILY
Qty: 90 TABLET | Refills: 2 | Status: SHIPPED | OUTPATIENT
Start: 2024-02-23

## 2024-02-23 RX ORDER — LOSARTAN POTASSIUM 50 MG/1
50 TABLET ORAL DAILY
Qty: 90 TABLET | Refills: 2 | Status: SHIPPED | OUTPATIENT
Start: 2024-02-23

## 2024-02-23 NOTE — PATIENT INSTRUCTIONS
If you have not heard from the central scheduler to schedule your testing in 48 hours, please call [phone].

## 2024-02-23 NOTE — PROGRESS NOTES
[facility]    Pineda Rice is 88 y.o. female     Patient is here today for follow-up appointment  Denies prior history of MI or CHF  Patient is here for management of hypertension.  Patient has been having difficult to control hypertension since she has a COVID infection in 11/2023 despite taking 3 COVID vaccinations  Patient is very nervous and anxious and tearful  Patient was not able to tolerate Coreg because of having rash and hives for which she took Benadryl with improvement  Unfortunately patient stopped taking benazepril as well because of misunderstanding    Past Medical History:   Diagnosis Date    Allergic rhinitis     Arthritis     Chronic bronchitis (HCC)     Chronic sinusitis     Hypertension     Sleep apnea        Review of Systems:  Cardiac symptoms as noted above in HPI. All others negative.    Current Outpatient Medications   Medication Sig    acetaminophen (TYLENOL) 500 MG CAPS capsule Take 2 capsules by mouth every 6 hours as needed for Pain    PARoxetine (PAXIL) 40 MG tablet Take 1 tablet by mouth every morning    rosuvastatin (CRESTOR) 10 MG tablet Take 1 tablet by mouth daily    benazepril (LOTENSIN) 40 MG tablet Take 1 tablet by mouth daily    Fluticasone Propionate, Inhal, 50 MCG/ACT AEPB Inhale into the lungs    LORazepam (ATIVAN) 1 MG tablet Take 1 tablet by mouth daily.    meclizine (ANTIVERT) 25 MG tablet Take 1 tablet by mouth every 6 hours for the next 3 days.  Then stop taking the meclizine.  Restart the meclizine for 3 day intervals if vertigo/ dizziness returns.     No current facility-administered medications for this visit.       No past surgical history on file.    Allergies and Sensitivities:  Allergies   Allergen Reactions    Sulfur Hives    Codeine Nausea And Vomiting       Family History:  No family history on file.    Social History:  Social History     Tobacco Use    Smoking status: Never    Smokeless

## 2024-02-23 NOTE — PROGRESS NOTES
Pineda Rice was seen in our office today for BP evaluation. Listed below are the patients current meds:      Current Outpatient Medications:     carvedilol (COREG) 12.5 MG tablet, Take 1 tablet by mouth 2 times daily, Disp: 180 tablet, Rfl: 3    acetaminophen (TYLENOL) 500 MG CAPS capsule, Take 2 capsules by mouth every 6 hours as needed for Pain, Disp: , Rfl:     PARoxetine (PAXIL) 40 MG tablet, Take 1 tablet by mouth every morning, Disp: , Rfl:     rosuvastatin (CRESTOR) 10 MG tablet, Take 1 tablet by mouth daily, Disp: , Rfl:     benazepril (LOTENSIN) 40 MG tablet, Take 1 tablet by mouth daily, Disp: , Rfl:     Fluticasone Propionate, Inhal, 50 MCG/ACT AEPB, Inhale into the lungs, Disp: , Rfl:     LORazepam (ATIVAN) 1 MG tablet, Take 1 tablet by mouth daily., Disp: , Rfl:     meclizine (ANTIVERT) 25 MG tablet, Take 1 tablet by mouth every 6 hours for the next 3 days.  Then stop taking the meclizine.  Restart the meclizine for 3 day intervals if vertigo/ dizziness returns., Disp: , Rfl:       No current facility-administered medications for this visit.    Vitals:    02/23/24 1037   BP: (!) 140/100   Site: Right Upper Arm   Position: Sitting   Cuff Size: Large Adult   Pulse: (!) 49   SpO2: (!) 89%        Plan:     Patient to continue current medications. Advised patient that I would forward to Dr. Gilman for review and further instructions. Advised patient that we would call within 24-48 hours with any changes. Patient verbalized understanding.

## 2024-02-28 ENCOUNTER — HOSPITAL ENCOUNTER (OUTPATIENT)
Facility: HOSPITAL | Age: 89
Discharge: HOME | End: 2024-03-01
Attending: INTERNAL MEDICINE
Payer: MEDICARE

## 2024-02-28 DIAGNOSIS — I10 HYPERTENSION, UNSPECIFIED TYPE: ICD-10-CM

## 2024-02-28 LAB
VAS AORTA DIST PSV: 79.7 CM/S
VAS AORTA MID PSV: 102.1 CM/S
VAS AORTA PROX PSV: 94.8 CM/S
VAS LEFT INTERLOBAR EDV: 12 CM/S
VAS LEFT INTERLOBAR PSV: 37.2 CM/S
VAS LEFT INTERLOBAR RI: 0.68
VAS LEFT KIDNEY LENGTH: 9.24 CM
VAS LEFT KIDNEY WIDTH: 5.06 CM
VAS LEFT RENAL DIST EDV: 11.2 CM/S
VAS LEFT RENAL DIST PSV: 66.1 CM/S
VAS LEFT RENAL DIST RAR: 0.65
VAS LEFT RENAL DIST RI: 0.83 NO UNITS
VAS LEFT RENAL LOWER PARENCHYMA EDV: 9 CM/S
VAS LEFT RENAL LOWER PARENCHYMA PSV: 27.3 CM/S
VAS LEFT RENAL LOWER PARENCHYMA RI: 0.67
VAS LEFT RENAL MID EDV: 28.3 CM/S
VAS LEFT RENAL MID PSV: 138.1 CM/S
VAS LEFT RENAL MID RAR: 1.35
VAS LEFT RENAL MID RI: 0.8 NO UNITS
VAS LEFT RENAL MIDDLE PARENCHYMA EDV: 10 CM/S
VAS LEFT RENAL MIDDLE PARENCHYMA PSV: 30 CM/S
VAS LEFT RENAL MIDDLE PARENCHYMA RI: 0.67
VAS LEFT RENAL PROX EDV: 37.1 CM/S
VAS LEFT RENAL PROX PSV: 127.1 CM/S
VAS LEFT RENAL PROX RAR: 1.24
VAS LEFT RENAL PROX RI: 0.71 NO UNITS
VAS LEFT RENAL RAR: 1.35
VAS LEFT RENAL SEGMENTAL ACCEL TIME: 0.1 S
VAS RIGHT INTERLOBAR EDV: 15.6 CM/S
VAS RIGHT INTERLOBAR PSV: 63.9 CM/S
VAS RIGHT INTERLOBAR RI: 0.76
VAS RIGHT KIDNEY LENGTH: 9.61 CM
VAS RIGHT KIDNEY WIDTH: 4.37 CM
VAS RIGHT RENAL DIST EDV: 16.1 CM/S
VAS RIGHT RENAL DIST PSV: 60.9 CM/S
VAS RIGHT RENAL DIST RAR: 0.6
VAS RIGHT RENAL DIST RI: 0.74 NO UNITS
VAS RIGHT RENAL LOWER PARENCHYMA EDV: 12 CM/S
VAS RIGHT RENAL LOWER PARENCHYMA PSV: 36.6 CM/S
VAS RIGHT RENAL LOWER PARENCHYMA RI: 0.67
VAS RIGHT RENAL MID EDV: 28.8 CM/S
VAS RIGHT RENAL MID PSV: 123.6 CM/S
VAS RIGHT RENAL MID RAR: 1.21
VAS RIGHT RENAL MID RI: 0.77 NO UNITS
VAS RIGHT RENAL MIDDLE PARENCHYMA EDV: 7.9 CM/S
VAS RIGHT RENAL MIDDLE PARENCHYMA PSV: 23.9 CM/S
VAS RIGHT RENAL MIDDLE PARENCHYMA RI: 0.67
VAS RIGHT RENAL ORIGIN EDV: 22.8 CM/S
VAS RIGHT RENAL ORIGIN PSV: 98.2 CM/S
VAS RIGHT RENAL ORIGIN RAR: 0.96
VAS RIGHT RENAL ORIGIN RI: 0.77
VAS RIGHT RENAL PROX EDV: 24.7 CM/S
VAS RIGHT RENAL PROX PSV: 115.4 CM/S
VAS RIGHT RENAL PROX RAR: 1.13
VAS RIGHT RENAL PROX RI: 0.79 NO UNITS
VAS RIGHT RENAL RAR: 1.21
VAS RIGHT RENAL SEGMENTAL ACCEL TIME: 0.09 S
VAS RIGHT RENAL UPPER PARENCHYMA EDV: 8.5 CM/S
VAS RIGHT RENAL UPPER PARENCHYMA PSV: 25.7 CM/S
VAS RIGHT RENAL UPPER PARENCHYMA RI: 0.67

## 2024-02-28 PROCEDURE — 93975 VASCULAR STUDY: CPT

## 2024-04-22 ENCOUNTER — OFFICE VISIT (OUTPATIENT)
Age: 89
End: 2024-04-22
Payer: MEDICARE

## 2024-04-22 VITALS
HEIGHT: 66 IN | OXYGEN SATURATION: 94 % | HEART RATE: 101 BPM | SYSTOLIC BLOOD PRESSURE: 128 MMHG | WEIGHT: 165 LBS | BODY MASS INDEX: 26.52 KG/M2 | DIASTOLIC BLOOD PRESSURE: 80 MMHG

## 2024-04-22 DIAGNOSIS — I10 HYPERTENSION, UNSPECIFIED TYPE: ICD-10-CM

## 2024-04-22 DIAGNOSIS — E78.5 HYPERLIPIDEMIA, UNSPECIFIED HYPERLIPIDEMIA TYPE: Primary | ICD-10-CM

## 2024-04-22 DIAGNOSIS — I11.9 HYPERTENSIVE HEART DISEASE WITHOUT HEART FAILURE: ICD-10-CM

## 2024-04-22 PROCEDURE — 1090F PRES/ABSN URINE INCON ASSESS: CPT | Performed by: PHYSICIAN ASSISTANT

## 2024-04-22 PROCEDURE — 1123F ACP DISCUSS/DSCN MKR DOCD: CPT | Performed by: PHYSICIAN ASSISTANT

## 2024-04-22 PROCEDURE — G8419 CALC BMI OUT NRM PARAM NOF/U: HCPCS | Performed by: PHYSICIAN ASSISTANT

## 2024-04-22 PROCEDURE — 99214 OFFICE O/P EST MOD 30 MIN: CPT | Performed by: PHYSICIAN ASSISTANT

## 2024-04-22 PROCEDURE — G8428 CUR MEDS NOT DOCUMENT: HCPCS | Performed by: PHYSICIAN ASSISTANT

## 2024-04-22 PROCEDURE — 4004F PT TOBACCO SCREEN RCVD TLK: CPT | Performed by: PHYSICIAN ASSISTANT

## 2024-04-22 NOTE — PROGRESS NOTES
[facility]    Pineda Rice is a 88 y.o. female, pmhx of HTN, HLD       Presents to the office today for follow-up.  Blood pressure has improved since taking amlodipine and losartan.  Patient did start cutting her amlodipine dosage in half because she thought that the medication would affect her kidneys and has caused increased urination. Since her blood pressure has been better controlled she states her dizziness has resolved. Denies CP, SOB, diaphoresis, N/V/D, weight gain, LE edema, orthopnea, PND, palpitations, near syncope or syncope, dizziness.     Past Medical History:   Diagnosis Date    Allergic rhinitis     Arthritis     Chronic bronchitis (HCC)     Chronic sinusitis     Hypertension     Sleep apnea          No past surgical history on file.    Current Outpatient Medications   Medication Sig    amLODIPine (NORVASC) 10 MG tablet Take 1 tablet by mouth daily    losartan (COZAAR) 50 MG tablet Take 1 tablet by mouth daily    acetaminophen (TYLENOL) 500 MG CAPS capsule Take 2 capsules by mouth every 6 hours as needed for Pain    PARoxetine (PAXIL) 40 MG tablet Take 1 tablet by mouth every morning    rosuvastatin (CRESTOR) 10 MG tablet Take 1 tablet by mouth daily    Fluticasone Propionate, Inhal, 50 MCG/ACT AEPB Inhale into the lungs    LORazepam (ATIVAN) 1 MG tablet Take 1 tablet by mouth daily.    meclizine (ANTIVERT) 25 MG tablet Take 1 tablet by mouth every 6 hours for the next 3 days.  Then stop taking the meclizine.  Restart the meclizine for 3 day intervals if vertigo/ dizziness returns.     No current facility-administered medications for this visit.       Allergies and Sensitivities:  Allergies   Allergen Reactions    Sulfur Hives    Codeine Nausea And Vomiting       Family History:  No family history on file.    Social History:  Social History     Tobacco Use    Smoking status: Never    Smokeless tobacco: Never   Substance Use Topics    Alcohol use: No     She  reports that she has never

## 2024-05-28 DIAGNOSIS — I10 HYPERTENSION, UNSPECIFIED TYPE: ICD-10-CM

## 2024-05-28 RX ORDER — LOSARTAN POTASSIUM 50 MG/1
50 TABLET ORAL DAILY
Qty: 90 TABLET | Refills: 2 | Status: SHIPPED | OUTPATIENT
Start: 2024-05-28

## 2024-05-28 RX ORDER — AMLODIPINE BESYLATE 10 MG/1
10 TABLET ORAL DAILY
Qty: 90 TABLET | Refills: 2 | Status: SHIPPED | OUTPATIENT
Start: 2024-05-28

## 2024-06-19 ENCOUNTER — TELEPHONE (OUTPATIENT)
Age: 89
End: 2024-06-19

## 2024-06-21 ENCOUNTER — TELEPHONE (OUTPATIENT)
Age: 89
End: 2024-06-21

## 2024-08-23 ENCOUNTER — OFFICE VISIT (OUTPATIENT)
Age: 89
End: 2024-08-23
Payer: MEDICARE

## 2024-08-23 VITALS
BODY MASS INDEX: 25.9 KG/M2 | SYSTOLIC BLOOD PRESSURE: 126 MMHG | DIASTOLIC BLOOD PRESSURE: 70 MMHG | WEIGHT: 165 LBS | HEART RATE: 77 BPM | HEIGHT: 67 IN | OXYGEN SATURATION: 96 %

## 2024-08-23 DIAGNOSIS — I50.9 CONGESTIVE HEART FAILURE, UNSPECIFIED HF CHRONICITY, UNSPECIFIED HEART FAILURE TYPE (HCC): Primary | ICD-10-CM

## 2024-08-23 DIAGNOSIS — I10 HYPERTENSION, UNSPECIFIED TYPE: ICD-10-CM

## 2024-08-23 PROCEDURE — 1090F PRES/ABSN URINE INCON ASSESS: CPT | Performed by: INTERNAL MEDICINE

## 2024-08-23 PROCEDURE — 1123F ACP DISCUSS/DSCN MKR DOCD: CPT | Performed by: INTERNAL MEDICINE

## 2024-08-23 PROCEDURE — G8427 DOCREV CUR MEDS BY ELIG CLIN: HCPCS | Performed by: INTERNAL MEDICINE

## 2024-08-23 PROCEDURE — 1036F TOBACCO NON-USER: CPT | Performed by: INTERNAL MEDICINE

## 2024-08-23 PROCEDURE — G8419 CALC BMI OUT NRM PARAM NOF/U: HCPCS | Performed by: INTERNAL MEDICINE

## 2024-08-23 PROCEDURE — 99214 OFFICE O/P EST MOD 30 MIN: CPT | Performed by: INTERNAL MEDICINE

## 2024-08-23 RX ORDER — AMLODIPINE BESYLATE 10 MG/1
5 TABLET ORAL DAILY
Qty: 30 TABLET | Refills: 5 | Status: SHIPPED | OUTPATIENT
Start: 2024-08-23

## 2024-08-23 ASSESSMENT — PATIENT HEALTH QUESTIONNAIRE - PHQ9
SUM OF ALL RESPONSES TO PHQ QUESTIONS 1-9: 0
2. FEELING DOWN, DEPRESSED OR HOPELESS: NOT AT ALL
SUM OF ALL RESPONSES TO PHQ QUESTIONS 1-9: 0
SUM OF ALL RESPONSES TO PHQ9 QUESTIONS 1 & 2: 0
1. LITTLE INTEREST OR PLEASURE IN DOING THINGS: NOT AT ALL

## 2024-08-23 NOTE — PROGRESS NOTES
[facility]    Pineda Rice is a 88 y.o. female, pmhx of HTN, HLD     Denies any resting or exertional chest pain or chest pressure to suggest angina or any dyspnea to suggest heart failure.   No presyncope or syncope  Denies any PND or LE edema.   Taking all medications regularly.     Past Medical History:   Diagnosis Date    Allergic rhinitis     Arthritis     Chronic bronchitis (HCC)     Chronic sinusitis     Hypertension     Sleep apnea          No past surgical history on file.    Current Outpatient Medications   Medication Sig    amLODIPine (NORVASC) 10 MG tablet Take 1 tablet by mouth daily    losartan (COZAAR) 50 MG tablet Take 1 tablet by mouth daily    acetaminophen (TYLENOL) 500 MG CAPS capsule Take 2 capsules by mouth every 6 hours as needed for Pain    PARoxetine (PAXIL) 40 MG tablet Take 1 tablet by mouth every morning    rosuvastatin (CRESTOR) 10 MG tablet Take 1 tablet by mouth daily    Fluticasone Propionate, Inhal, 50 MCG/ACT AEPB Inhale into the lungs    LORazepam (ATIVAN) 1 MG tablet Take 1 tablet by mouth daily.    meclizine (ANTIVERT) 25 MG tablet Take 1 tablet by mouth every 6 hours for the next 3 days.  Then stop taking the meclizine.  Restart the meclizine for 3 day intervals if vertigo/ dizziness returns.     No current facility-administered medications for this visit.       Allergies and Sensitivities:  Allergies   Allergen Reactions    Sulfur Hives    Codeine Nausea And Vomiting    Coreg [Carvedilol] Rash       Family History:  No family history on file.    Social History:  Social History     Tobacco Use    Smoking status: Never    Smokeless tobacco: Never   Vaping Use    Vaping status: Never Used   Substance Use Topics    Alcohol use: No    Drug use: Never     She  reports that she has never smoked. She has never used smokeless tobacco.  She  reports no history of alcohol use.    Review of Systems:  Cardiac symptoms as noted above in HPI. All others negative.    Denies

## 2024-08-28 DIAGNOSIS — I10 HYPERTENSION, UNSPECIFIED TYPE: ICD-10-CM

## 2024-08-29 RX ORDER — LOSARTAN POTASSIUM 50 MG/1
50 TABLET ORAL DAILY
Qty: 90 TABLET | Refills: 3 | Status: SHIPPED | OUTPATIENT
Start: 2024-08-29

## 2024-10-04 ENCOUNTER — TELEPHONE (OUTPATIENT)
Age: 89
End: 2024-10-04

## 2025-03-03 ENCOUNTER — TELEPHONE (OUTPATIENT)
Age: 89
End: 2025-03-03

## 2025-03-04 DIAGNOSIS — I10 HYPERTENSION, UNSPECIFIED TYPE: ICD-10-CM

## 2025-03-04 RX ORDER — LOSARTAN POTASSIUM 50 MG/1
50 TABLET ORAL DAILY
Qty: 90 TABLET | Refills: 3 | Status: SHIPPED | OUTPATIENT
Start: 2025-03-04

## 2025-04-23 DIAGNOSIS — I10 HYPERTENSION, UNSPECIFIED TYPE: ICD-10-CM

## 2025-04-23 RX ORDER — AMLODIPINE BESYLATE 10 MG/1
5 TABLET ORAL DAILY
Qty: 30 TABLET | Refills: 5 | Status: SHIPPED | OUTPATIENT
Start: 2025-04-23

## 2025-06-18 DIAGNOSIS — I10 HYPERTENSION, UNSPECIFIED TYPE: ICD-10-CM

## 2025-06-19 RX ORDER — AMLODIPINE BESYLATE 10 MG/1
5 TABLET ORAL DAILY
Qty: 30 TABLET | Refills: 2 | Status: SHIPPED | OUTPATIENT
Start: 2025-06-19

## 2025-06-19 RX ORDER — LOSARTAN POTASSIUM 50 MG/1
50 TABLET ORAL DAILY
Qty: 90 TABLET | Refills: 1 | Status: SHIPPED | OUTPATIENT
Start: 2025-06-19

## 2025-06-26 ENCOUNTER — TELEPHONE (OUTPATIENT)
Age: 89
End: 2025-06-26

## 2025-06-27 ENCOUNTER — OFFICE VISIT (OUTPATIENT)
Age: 89
End: 2025-06-27
Payer: MEDICARE

## 2025-06-27 VITALS
WEIGHT: 167 LBS | HEIGHT: 67 IN | SYSTOLIC BLOOD PRESSURE: 150 MMHG | HEART RATE: 85 BPM | OXYGEN SATURATION: 91 % | BODY MASS INDEX: 26.21 KG/M2 | DIASTOLIC BLOOD PRESSURE: 90 MMHG

## 2025-06-27 DIAGNOSIS — I10 ESSENTIAL HYPERTENSION WITH GOAL BLOOD PRESSURE LESS THAN 140/90: Primary | ICD-10-CM

## 2025-06-27 DIAGNOSIS — E78.00 PURE HYPERCHOLESTEROLEMIA: ICD-10-CM

## 2025-06-27 PROCEDURE — 1126F AMNT PAIN NOTED NONE PRSNT: CPT | Performed by: INTERNAL MEDICINE

## 2025-06-27 PROCEDURE — 1159F MED LIST DOCD IN RCRD: CPT | Performed by: INTERNAL MEDICINE

## 2025-06-27 PROCEDURE — 1123F ACP DISCUSS/DSCN MKR DOCD: CPT | Performed by: INTERNAL MEDICINE

## 2025-06-27 PROCEDURE — 1090F PRES/ABSN URINE INCON ASSESS: CPT | Performed by: INTERNAL MEDICINE

## 2025-06-27 PROCEDURE — G8419 CALC BMI OUT NRM PARAM NOF/U: HCPCS | Performed by: INTERNAL MEDICINE

## 2025-06-27 PROCEDURE — 99214 OFFICE O/P EST MOD 30 MIN: CPT | Performed by: INTERNAL MEDICINE

## 2025-06-27 PROCEDURE — 1036F TOBACCO NON-USER: CPT | Performed by: INTERNAL MEDICINE

## 2025-06-27 PROCEDURE — G8427 DOCREV CUR MEDS BY ELIG CLIN: HCPCS | Performed by: INTERNAL MEDICINE

## 2025-06-27 RX ORDER — AMLODIPINE BESYLATE 10 MG/1
10 TABLET ORAL DAILY
Qty: 90 TABLET | Refills: 2 | Status: SHIPPED | OUTPATIENT
Start: 2025-06-27

## 2025-06-27 NOTE — PROGRESS NOTES
[facility]    Pineda Rice is a 89 y.o. female, pmhx of HTN, HLD     Denies any resting or exertional chest pain or chest pressure to suggest angina or any dyspnea to suggest heart failure.   No presyncope or syncope  Denies any PND or LE edema.   Taking all medications regularly.   Lately blood pressure has been elevated for the last 4 weeks.  She is taking medication as prescribed.    Past Medical History:   Diagnosis Date    Allergic rhinitis     Arthritis     Cardiomyopathy (HCC)     Chronic bronchitis (HCC)     Chronic sinusitis     Hypertension     Sleep apnea          No past surgical history on file.    Current Outpatient Medications   Medication Sig    losartan (COZAAR) 50 MG tablet Take 1 tablet by mouth daily    acetaminophen (TYLENOL) 500 MG CAPS capsule Take 2 capsules by mouth every 6 hours as needed for Pain    PARoxetine (PAXIL) 40 MG tablet Take 1 tablet by mouth every morning    rosuvastatin (CRESTOR) 10 MG tablet Take 1 tablet by mouth daily    Fluticasone Propionate, Inhal, 50 MCG/ACT AEPB Inhale into the lungs    LORazepam (ATIVAN) 1 MG tablet Take 1 tablet by mouth daily.    meclizine (ANTIVERT) 25 MG tablet Take 1 tablet by mouth every 6 hours for the next 3 days.  Then stop taking the meclizine.  Restart the meclizine for 3 day intervals if vertigo/ dizziness returns.     No current facility-administered medications for this visit.       Allergies and Sensitivities:  Allergies   Allergen Reactions    Sulfur Hives    Codeine Nausea And Vomiting    Coreg [Carvedilol] Rash       Family History:  No family history on file.    Social History:  Social History     Tobacco Use    Smoking status: Never    Smokeless tobacco: Never   Vaping Use    Vaping status: Never Used   Substance Use Topics    Alcohol use: No    Drug use: Never     She  reports that she has never smoked. She has never used smokeless tobacco.  She  reports no history of alcohol use.    Review of Systems:  Cardiac